# Patient Record
Sex: MALE | Race: WHITE | Employment: FULL TIME | URBAN - METROPOLITAN AREA
[De-identification: names, ages, dates, MRNs, and addresses within clinical notes are randomized per-mention and may not be internally consistent; named-entity substitution may affect disease eponyms.]

---

## 2019-06-26 ENCOUNTER — OFFICE VISIT (OUTPATIENT)
Dept: RHEUMATOLOGY | Age: 34
End: 2019-06-26

## 2019-06-26 VITALS
HEIGHT: 66 IN | HEART RATE: 67 BPM | TEMPERATURE: 98.2 F | DIASTOLIC BLOOD PRESSURE: 81 MMHG | WEIGHT: 172 LBS | SYSTOLIC BLOOD PRESSURE: 131 MMHG | BODY MASS INDEX: 27.64 KG/M2 | RESPIRATION RATE: 18 BRPM

## 2019-06-26 DIAGNOSIS — J06.9 RECURRENT URI (UPPER RESPIRATORY INFECTION): ICD-10-CM

## 2019-06-26 DIAGNOSIS — M06.4 UNDIFFERENTIATED INFLAMMATORY ARTHRITIS (HCC): Primary | ICD-10-CM

## 2019-06-26 RX ORDER — PREDNISONE 5 MG/1
TABLET ORAL
Qty: 91 TAB | Refills: 0 | Status: SHIPPED | OUTPATIENT
Start: 2019-06-26 | End: 2019-08-10

## 2019-06-26 RX ORDER — BISMUTH SUBSALICYLATE 262 MG
1 TABLET,CHEWABLE ORAL DAILY
COMMUNITY

## 2019-06-26 RX ORDER — AZELASTINE HCL 205.5 UG/1
1 SPRAY NASAL DAILY
COMMUNITY
End: 2019-09-20

## 2019-06-26 RX ORDER — CALCIUM CARBONATE 500(1250)
TABLET ORAL DAILY
COMMUNITY

## 2019-06-26 NOTE — PROGRESS NOTES
REASON FOR VISIT    This is the initial evaluation for Mr. Xavier Profit a 36 y.o.  male for question of a seronegative spondyloarthritis. The patient is referred to the [facility] at the request of Dr. Avril Fair. HISTORY OF PRESENT ILLNESS     I have reviewed and summarized old records from None. In 3/01/2019, he developed pain in his wrists, knees, and ankles. He reports having occaisonal sore throat in 2/2019 that was severe. He had sinus involvement that preceded diffuse body aches. He also developed tightness in his anterior chest. He was tested negative for flu. He had some redness in his eyes without pain or photosensitivity. He recuperated but then had recurrence with laryngitis and hoarseness. He since developed cyclical diffuse body aches. He reports that he has recurrent episodes about twice a month that starts with sore throat that then follows with knee, ankles, wrist pain. He has baseline aching wrist pain and at times, there is sharp pain. He reports that his knees feel stiff. His ankle pain is also aching. Diclofenac helped. Naprosyn helped a little. Today, he reports morning stiffness in his wrists, knees, and and ankles lasting an hour. He has aching pain in his wrists and ankles that is worse in the morning. Sometimes, the pain shoots into the fingers. He denies swelling. His pain is worse with rest and improves with activity. He feels better after working out. Some days, he has back stiffness in the morning that resolves after moving minutes. It happens a few times a week. It does not wake him at night. Diclofenac seems to be helping. He denies urinary symptoms. He denies any new sexual partner. His anterior rib pain lasts about an hour and occurs once a week and improves if he stretches out. Family history is negative. He works at [employer] and a .     Therapy History includes:  Current NSAIDs include: diclofenac 75 mg twice daily  Current DMARD include: none  Prior DMARD therapy includes: none  The following DMARDs have been ineffective: none  The following DMARDs were stopped because of side effects: none    REVIEW OF SYSTEMS    A 15 point review of systems was performed and summarized below. The questionnaire was reviewed with the patient and scanned into the patient's medical record.     General: endorses fatigue, denies recent weight gain, recent weight loss, weakness, fever, night sweats  Musculoskeletal: endorses joint pain, morning stiffness (lasting 30 minutes), denies joint swelling, muscle pain  Ears: denies ringing in ears, loss of hearing, deafness  Eyes: denies pain, redness, loss of vision, double vision, blurred vision, dryness, foreign body sensation  Mouth: denies sore tongue, oral ulcers, bleeding gums, loss of taste, dryness, increased dental caries  Nose: denies nosebleeds, loss of smell, nasal ulcers  Throat: endorses frequent sore throats, denies hoarseness, difficulty in swallowing, pain in jaw while chewing  Neck: denies swollen glands, tender glands  Cardiopulmonary: denies pain in chest, irregular heart beat, sudden changes in heart beat, shortness of breath, difficulty breathing at night, dry cough, productive cough, coughing of blood, wheezing  Gastrointestinal: denies nausea, heartburn, stomach pain relieved by food, vomiting of blood/\"coffee grounds\", jaundice, increasing constipation, persistent diarrhea, blood in stools, black stools  Genitourinary: denies nocturia, difficult urination, pain or burning on urination, blood in urine, cloudy urine, pus in urine, genital discharge, frequent urination, rash/ulcers, sexual difficulties, prostate trouble  Hematologic: denies anemia, bleeding tendency, blood clots  Skin: denies easy bruising, sun sensitive, rash, redness, hives, skin tightness, nodules/bumps, hair loss, color changes of hands or feet in the cold (Raynaud's)  Neurologic: denies headaches, dizziness, muscle weakness, numbness or tingling in hands/feet, memory loss  Psychiatric: denies depression, excessive worries, PTSD, Bipolar  Sleep: endorses daytime somnolence, denies poor sleep (6-7 hours), snoring, apnea, difficulty falling asleep, difficulty staying asleep     PAST MEDICAL HISTORY    He has no past medical history of Autoimmune disease ([address] FAMILY HISTORY    His family history includes Dementia in his maternal grandmother; Heart Disease in his maternal grandfather and paternal grandfather; No Known Problems in his father, mother, and sister. SOCIAL HISTORY    He reports that he has never smoked. He has never used smokeless tobacco. He reports that he drinks alcohol. He reports that he does not use drugs. HEALTH MAINTENANCE    Immunizations    There is no immunization history on file for this patient. MEDICATIONS    Current Outpatient Medications   Medication Sig Dispense Refill    multivitamin (ONE A DAY) tablet Take 1 Tab by mouth daily.  calcium carbonate (OS-BARBARA) 500 mg calcium (1,250 mg) tablet Take  by mouth daily.  azelastine (ASTEPRO) 0.15 % (205.5 mcg) 1 Spray by Both Nostrils route daily.  TURMERIC PO Take  by mouth two (2) times a day.  predniSONE (DELTASONE) 5 mg tablet 4 tabs daily for 7 days, 3 tabs for 7 days, 2 tabs for 14 days, 1 tab for 14 days 91 Tab 0     ALLERGIES    No Known Allergies    PHYSICAL EXAMINATION    Visit Vitals  /81   Pulse 67   Temp 98.2 °F (36.8 °C)   Resp 18   Ht 5' 6\" (1.676 m)   Wt 172 lb (78 kg)   BMI 27.76 kg/m²     Body mass index is 27.76 kg/m². General: Patient is alert, oriented x 3, not in acute distress    HEENT:   Conjunctiva are not injected and appear moist, oral mucous membranes are moist, there are no ulcers present, there is no alopecia, neck is supple, there is no lymphadenopathy    Cardiovascular:  Heart is regular rate and rhythm, no murmurs.     Chest:  Lungs are clear to auscultation bilaterally. Extremities:  Free of clubbing, cyanosis, edema, extremities well perfused. Neurological:  Muscle strength is full in upper and lower extremities. Skin:    Psoriasis:     no  Nail Pitting:     no  Onycholysis:     no  Palmoplantar pustulosis:   no  Acne fulminans:    no  Acne conglobata:    no  Hidradenitis Suppurativa:   no  Dissecting cellulitis of the scalp:  no  Pilonidal sinus:    no  Erythema nodosum:    no    Musculoskeletal:  A comprehensive musculoskeletal exam was performed for all joints of each upper and lower extremity and assessed for swelling, tenderness and range of motion. Bilateral ankle tenderness without swelling  Achilles tendon non-tender  Bilateral Toe tenderness    Costochondritis:  no   Synovitis:   yes  Dactylitis:   no  Enthesitis:   no    Joint Count 6/26/2019   Patient pain (0-100) 40   MHAQ 0   Left wrist- Tender 1   Left wrist- Swollen 1   Left thumb IP - Tender 1   Left thumb IP - Swollen 0   Left 2nd PIP - Tender 1   Left 2nd PIP - Swollen 1   Left 3rd PIP - Tender 1   Left 3rd PIP - Swollen 1   Left 4th PIP - Tender 1   Left 4th PIP - Swollen 1   Left 5th PIP - Tender 1   Left 5th PIP - Swollen 1   Right wrist- Tender 1   Right wrist- Swollen 1   Right 4th MCP - Tender 1   Right 4th MCP - Swollen 1   Right 5th MCP - Tender 1   Right 5th MCP - Swollen 1   Right thumb IP - Tender 1   Right thumb IP - Swollen 0   Right 2nd PIP - Tender 1   Right 2nd PIP - Swollen 1   Right 3rd PIP - Tender 1   Right 3rd PIP - Swollen 1   Right 5th PIP - Tender 1   Right 5th PIP - Swollen 0   Tender Joint Count (Total) 13   Swollen Joint Count (Total) 10   Physician Assessment (0-10) 2   Patient Assessment (0-10) 2   CDAI Total (calculated) 27     DATA REVIEW    Prior medical records were reviewed and are summarized as below:    Laboratory data: none    Imaging: none     ASSESSMENT AND PLAN    1) Undifferentiated Inflammatory Arthritis.  This appears to have started after a preceding URI. He denies urinary or GI symptoms. He reports intermittent low back stiffness which is not regular. He also reports intermittent sternal stiffness that is intermittent and resolves after an hour. These raise the suspicion for an spondyloarthritis but the are not persistent, make it it unlikely. He does have active joint involvement. His CDAI was 27 with 13 tender and 10 swollen joints, including bilateral ankle and toe involvement, consistent with high high disease activity. He feels better with diclofenac. I ordered labs and radiographs today. I will start him on a short course of prednisone, called a prednisone taper, with 5 mg tablets. This regimen is to be taken as follows: 4 tabs (20 mg) for 7 days, 3 tabs (15 mg) for 7 days, 2 tabs (10 mg) for 14 days and then 1 tab (5 mg) for 14 days, and then stop. [facility] after I review his studies. I asked him to stop diclofenac while taking prednisone. 2) Recurrent URI. This may have activated his immune system. The patient voiced understanding of the aforementioned assessment and plan. Summary of plan was provided in the After Visit Summary patient instructions. I also provided education about MyChart setup and utility.     TODAY'S ORDERS    Orders Placed This Encounter    QUANTIFERON-TB GOLD PLUS    XR HAND RT MIN 3 V    XR HAND LT MIN 3 V    XR FOOT RT MIN 3 V    XR FOOT LT MIN 3 V    XR SI JTS MIN 3 V    CYCLIC CITRUL PEPTIDE AB, IGG    CBC WITH AUTOMATED DIFF    CHRONIC HEPATITIS PANEL    METABOLIC PANEL, COMPREHENSIVE    C REACTIVE PROTEIN, QT    SED RATE (ESR)    HLA-B27    RHEUMATOID FACTOR, QL    PROTEIN ELECTROPHORESIS W/ REFLX ANNIE    VITAMIN D, 25 HYDROXY    URIC ACID    ANTINUCLEAR ANTIBODIES, IFA    predniSONE (DELTASONE) 5 mg tablet     Future Appointments   Date Time Provider Jorge Garrett   9/20/2019  8:20 AM MD YISEL Silvestre 11 Janeth Hines MD, [address] [facility]    Adult Rheumatology   Rheumatology Ultrasound Certified  [facility]  A Part of [facility], [address] [place] [address]   Phone [phone]  Fax [phone]

## 2019-06-26 NOTE — PATIENT INSTRUCTIONS
I will start you on a short course of prednisone, called a prednisone taper, with 5 mg tablets. This regimen is to be taken as follows:  
 
 - 4 tablets (20 mg), all at once, daily for 7 days - 3 tablets (15 mg), all at once, daily for 7 days - 2 tablets (10 mg), all at once, daily for 14 days - 1 tablet (5 mg), daily for 14 days - then STOP

## 2019-06-26 NOTE — LETTER
6/26/19 Patient: Aden Scheuermann YOB: 1985 Date of Visit: 6/26/2019 Josefa Moralez MD 
[address] [place] [address] VIA Facsimile: [phone] Dear Josefa Moralez MD, Thank you for referring Mr. Aden Scheuermann to [facility] for evaluation. My notes for this consultation are attached. If you have questions, please do not hesitate to call me. I look forward to following your patient along with you.  
 
 
Sincerely, 
 
Alisha Encinas MD

## 2019-07-01 ENCOUNTER — PATIENT MESSAGE (OUTPATIENT)
Dept: RHEUMATOLOGY | Age: 34
End: 2019-07-01

## 2019-07-01 DIAGNOSIS — M06.4 UNDIFFERENTIATED INFLAMMATORY ARTHRITIS (HCC): Primary | ICD-10-CM

## 2019-07-01 LAB
25(OH)D3+25(OH)D2 SERPL-MCNC: 31 NG/ML (ref 30–100)
ALBUMIN SERPL ELPH-MCNC: 4.4 G/DL (ref 2.9–4.4)
ALBUMIN SERPL-MCNC: 4.5 G/DL (ref 3.5–5.5)
ALBUMIN/GLOB SERPL: 1.4 {RATIO} (ref 0.7–1.7)
ALBUMIN/GLOB SERPL: 1.5 {RATIO} (ref 1.2–2.2)
ALP SERPL-CCNC: 80 IU/L (ref 39–117)
ALPHA1 GLOB SERPL ELPH-MCNC: 0.3 G/DL (ref 0–0.4)
ALPHA2 GLOB SERPL ELPH-MCNC: 0.7 G/DL (ref 0.4–1)
ALT SERPL-CCNC: 23 IU/L (ref 0–44)
AST SERPL-CCNC: 22 IU/L (ref 0–40)
B-GLOBULIN SERPL ELPH-MCNC: 1 G/DL (ref 0.7–1.3)
BASOPHILS # BLD AUTO: 0 X10E3/UL (ref 0–0.2)
BASOPHILS NFR BLD AUTO: 0 %
BILIRUB SERPL-MCNC: 0.3 MG/DL (ref 0–1.2)
BUN SERPL-MCNC: 10 MG/DL (ref 6–20)
BUN/CREAT SERPL: 13 (ref 9–20)
CALCIUM SERPL-MCNC: 9.6 MG/DL (ref 8.7–10.2)
CCP IGA+IGG SERPL IA-ACNC: 5 UNITS (ref 0–19)
CHLORIDE SERPL-SCNC: 104 MMOL/L (ref 96–106)
CO2 SERPL-SCNC: 26 MMOL/L (ref 20–29)
COMMENT, 144067: NORMAL
CREAT SERPL-MCNC: 0.8 MG/DL (ref 0.76–1.27)
CRP SERPL-MCNC: <1 MG/L (ref 0–10)
EOSINOPHIL # BLD AUTO: 0 X10E3/UL (ref 0–0.4)
EOSINOPHIL NFR BLD AUTO: 1 %
ERYTHROCYTE [DISTWIDTH] IN BLOOD BY AUTOMATED COUNT: 14.3 % (ref 12.3–15.4)
ERYTHROCYTE [SEDIMENTATION RATE] IN BLOOD BY WESTERGREN METHOD: 2 MM/HR (ref 0–15)
GAMMA GLOB SERPL ELPH-MCNC: 1.2 G/DL (ref 0.4–1.8)
GAMMA INTERFERON BACKGROUND BLD IA-ACNC: 0.02 IU/ML
GLOBULIN SER CALC-MCNC: 3 G/DL (ref 1.5–4.5)
GLOBULIN SER CALC-MCNC: 3.1 G/DL (ref 2.2–3.9)
GLUCOSE SERPL-MCNC: 90 MG/DL (ref 65–99)
HBV CORE AB SERPL QL IA: NEGATIVE
HBV CORE IGM SERPL QL IA: NEGATIVE
HBV E AB SERPL QL IA: NEGATIVE
HBV E AG SERPL QL IA: NEGATIVE
HBV SURFACE AB SER QL: NON REACTIVE
HBV SURFACE AG SERPL QL IA: NEGATIVE
HCT VFR BLD AUTO: 41.6 % (ref 37.5–51)
HCV AB S/CO SERPL IA: <0.1 S/CO RATIO (ref 0–0.9)
HGB BLD-MCNC: 14.3 G/DL (ref 13–17.7)
HLA-B27 QL NAA+PROBE: POSITIVE
IMM GRANULOCYTES # BLD AUTO: 0 X10E3/UL (ref 0–0.1)
IMM GRANULOCYTES NFR BLD AUTO: 0 %
LYMPHOCYTES # BLD AUTO: 1.5 X10E3/UL (ref 0.7–3.1)
LYMPHOCYTES NFR BLD AUTO: 33 %
M PROTEIN SERPL ELPH-MCNC: NORMAL G/DL
M TB IFN-G BLD-IMP: NEGATIVE
M TB IFN-G CD4+ BCKGRND COR BLD-ACNC: 0.02 IU/ML
MCH RBC QN AUTO: 28.6 PG (ref 26.6–33)
MCHC RBC AUTO-ENTMCNC: 34.4 G/DL (ref 31.5–35.7)
MCV RBC AUTO: 83 FL (ref 79–97)
MITOGEN IGNF BLD-ACNC: >10 IU/ML
MONOCYTES # BLD AUTO: 0.4 X10E3/UL (ref 0.1–0.9)
MONOCYTES NFR BLD AUTO: 9 %
NEUTROPHILS # BLD AUTO: 2.7 X10E3/UL (ref 1.4–7)
NEUTROPHILS NFR BLD AUTO: 57 %
PLATELET # BLD AUTO: 215 X10E3/UL (ref 150–450)
PLEASE NOTE, 011150: NORMAL
POTASSIUM SERPL-SCNC: 4.3 MMOL/L (ref 3.5–5.2)
PROT PATTERN SERPL ELPH-IMP: NORMAL
PROT SERPL-MCNC: 7.5 G/DL (ref 6–8.5)
QUANTIFERON INCUBATION, QF1T: NORMAL
QUANTIFERON TB2 AG: 0.02 IU/ML
RBC # BLD AUTO: 5 X10E6/UL (ref 4.14–5.8)
RHEUMATOID FACT SERPL-ACNC: <10 IU/ML (ref 0–13.9)
SERVICE CMNT-IMP: NORMAL
SODIUM SERPL-SCNC: 143 MMOL/L (ref 134–144)
URATE SERPL-MCNC: 5.4 MG/DL (ref 3.7–8.6)
WBC # BLD AUTO: 4.7 X10E3/UL (ref 3.4–10.8)

## 2019-07-03 RX ORDER — FOLIC ACID 1 MG/1
1 TABLET ORAL DAILY
Qty: 90 TAB | Refills: 0 | Status: SHIPPED | OUTPATIENT
Start: 2019-07-03 | End: 2019-09-20 | Stop reason: SDUPTHER

## 2019-07-03 RX ORDER — METHOTREXATE 2.5 MG/1
15 TABLET ORAL
Qty: 72 TAB | Refills: 0 | Status: SHIPPED | OUTPATIENT
Start: 2019-07-08 | End: 2019-09-12 | Stop reason: SDUPTHER

## 2019-07-03 NOTE — TELEPHONE ENCOUNTER
From: Les Head  Sent: 7/2/2019 5:59 PM EDT  To: Kaden David MD  Subject: RE:(No subject)    ----- Message from [address] [place] [address] 951, [facility] sent at 7/2/2019 5:59 PM EDT -----    Still some soreness, it's a bit better than on the Diclofenac. I definitely felt it hit my mood over the weekend, already a bit impacted by the entire situation it made me feel a bit more depressed. Possible side-effect observed in that my eyes seem to get tired more quickly, especially under the CFL lights at work and looking at a monitor all day. Clears up pretty quickly when I get outside. Felt a bit lightheaded when I stood up for a minute or so early Monday but not nearly as observable today. I resumed taking the omeprezol because I was getting some acid reflux. It seems to be taking care of it.  ----- Message -----  From: Kaden David MD  Sent: 7/1/2019 4:33 PM EDT  To: Les Head  Subject: (No subject)  How are you feeling on prednisone?

## 2019-08-02 ENCOUNTER — TELEPHONE (OUTPATIENT)
Dept: RHEUMATOLOGY | Age: 34
End: 2019-08-02

## 2019-08-02 NOTE — TELEPHONE ENCOUNTER
Patient is calling due to he is taking Methotrexate, and he is asking if he needs to do blood work for a Liver Function Test. His phone is [phone].

## 2019-08-05 NOTE — TELEPHONE ENCOUNTER
Spoke with pt and let him know that he had lab work done on 6/26 and that he is good on lab work until his next appt in Sept.  He stated an understanding.

## 2019-09-12 DIAGNOSIS — M06.4 UNDIFFERENTIATED INFLAMMATORY ARTHRITIS (HCC): ICD-10-CM

## 2019-09-12 RX ORDER — METHOTREXATE 2.5 MG/1
20 TABLET ORAL
Qty: 96 TAB | Refills: 0 | Status: SHIPPED | OUTPATIENT
Start: 2019-09-16 | End: 2019-09-20 | Stop reason: SDUPTHER

## 2019-09-20 ENCOUNTER — OFFICE VISIT (OUTPATIENT)
Dept: RHEUMATOLOGY | Age: 34
End: 2019-09-20

## 2019-09-20 VITALS
WEIGHT: 171 LBS | SYSTOLIC BLOOD PRESSURE: 127 MMHG | HEART RATE: 71 BPM | DIASTOLIC BLOOD PRESSURE: 84 MMHG | BODY MASS INDEX: 27.48 KG/M2 | RESPIRATION RATE: 18 BRPM | HEIGHT: 66 IN | TEMPERATURE: 97.5 F

## 2019-09-20 DIAGNOSIS — J06.9 RECURRENT URI (UPPER RESPIRATORY INFECTION): ICD-10-CM

## 2019-09-20 DIAGNOSIS — M45.A0 NON-RADIOGRAPHIC AXIAL SPONDYLOARTHRITIS (HCC): Primary | ICD-10-CM

## 2019-09-20 DIAGNOSIS — Z79.60 LONG-TERM USE OF IMMUNOSUPPRESSANT MEDICATION: ICD-10-CM

## 2019-09-20 RX ORDER — METHOTREXATE 2.5 MG/1
20 TABLET ORAL
Qty: 90 TAB | Refills: 0 | Status: SHIPPED | OUTPATIENT
Start: 2019-09-21 | End: 2020-02-12

## 2019-09-20 RX ORDER — FOLIC ACID 1 MG/1
1 TABLET ORAL DAILY
Qty: 90 TAB | Refills: 0 | Status: SHIPPED | OUTPATIENT
Start: 2019-09-20 | End: 2019-12-18

## 2019-09-20 NOTE — PROGRESS NOTES
REASON FOR VISIT    This is a follow-up visit for Mr. 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     Inflammatory arthritis phenotype includes:  Anti-CCP positive: no  Rheumatoid factor positive: no  HLA-B27 positive: yes  Inflammatory Back Pain: yes  Erosive disease: no  Sacroiliitis: no  Ankylosis: no  Psoriasis: no  Enthesitis: no  Dactylitis: no  Nail Pitting: no  Onycholysis: no  Splinter Hemorrhage: no  Extra-articular manifestations include: sternal pain (costochondritis), oral painful ulcers (since childhood)  SAPHO: no    Immunosuppression Screening (6/26/2019): Quantiferon TB: negative  PPD:  Not performed  Hepatitis B: negative  Hepatitis C: negative    Therapy History includes:  Current DMARD therapy include: methotrexate 20 mg every Saturday (7/01/2019 to present)  Prior DMARD therapy include: none  Discontinued DMARDs because of inefficacy: None  Discontinued DMARDs because of side effects: None    There is no immunization history on file for this patient. 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 Lindsey Monge returns for a follow-up. On his last visit, I suspected that he had a spondyloarthritis. I prescribed a prednisone taper but he did not tolerate it, so I started methotrexate     Today, he feels better. At times, he has morning stiffness in his knees, ankles, and low back lasting minutes. He no longer has sternal pain. He notes some scratchiness in his throat that resolves with Tylenol. It feels like as if he was coughing a lot but he is no longer coughing as much. He saw his eye doctor recently who diagnosed him with blepharitis. He a    He endorses sores in mouth not related to methotrexate, dry cough.     He denies fever, weight loss, blurred vision, vision loss, ankle swelling, dyspnea, nausea, vomiting, dysphagia, abdominal pain, black or bloody stool, fall since last visit, rash, easy bruising and increased thirst.    Mr. Gucci Gonzalez has continued his medications for arthritis and reports good tolerance without significant side effects. Last toxicity monitoring by blood work was done on 6/26/2019 and did not reveal any significant adverse effects. Most recent inflammatory markers from 6/26/2019 revealed a ESR 2 mm/hr and CRP <1 mg/L. The patient has not had any interval hospital admissions, infections, or surgeries. REVIEW OF SYSTEMS    A comprehensive review of systems was performed and pertinent results are documented in the HPI, review of systems is otherwise non-contributory. PAST MEDICAL HISTORY    He has a past medical history of Autoimmune disease ([address] FAMILY HISTORY    His family history includes Dementia in his maternal grandmother; Heart Disease in his maternal grandfather and paternal grandfather; No Known Problems in his father, mother, and sister. SOCIAL HISTORY    He reports that he has never smoked. He has never used smokeless tobacco. He reports that he drinks alcohol. He reports that he does not use drugs. MEDICATIONS    Current Outpatient Medications   Medication Sig Dispense Refill    omega 3-dha-epa-fish oil (FISH OIL) 100-160-1,000 mg cap Take  by mouth daily.  folic acid (FOLVITE) 1 mg tablet Take 1 Tab by mouth daily. 90 Tab 0    [START ON 9/21/2019] methotrexate (RHEUMATREX) 2.5 mg tablet Take 8 Tabs by mouth Every Saturday. 90 Tab 0    multivitamin (ONE A DAY) tablet Take 1 Tab by mouth daily.  calcium carbonate (OS-BARBARA) 500 mg calcium (1,250 mg) tablet Take  by mouth daily.  TURMERIC PO Take  by mouth two (2) times a day.           ALLERGIES    No Known Allergies    PHYSICAL EXAMINATION    Visit Vitals  /84   Pulse 71   Temp 97.5 °F (36.4 °C)   Resp 18   Ht 5' 6\" (1.676 m)   Wt 171 lb (77.6 kg)   BMI 27.60 kg/m²     Body mass index is 27.6 kg/m². General: Patient is alert, oriented x 3, not in acute distress    HEENT:   Sclerae are not injected and appear moist.  There is no alopecia. Neck is supple     Cardiovascular:  Heart is regular rate and rhythm, no murmurs. Chest:  Lungs are clear to auscultation bilaterally. No rhonchi, wheezes, or crackles. Extremities:  Free of clubbing, cyanosis, edema    Neurological exam:  Muscle strength is full in upper and lower extremities     Skin exam:  There are no rashes, no alopecia, no discoid lesions, no active Raynaud's, no livedo reticularis, no periungual erythema. Musculoskeletal exam:  A comprehensive musculoskeletal exam was performed for all joints of each upper and lower extremity and assessed for swelling, tenderness and range of motion.  Positive results are documented as below:    Bilateral ankle tenderness without swelling  Bilateral Toe tenderness (RESOLVED)    Z-Deformities:   no  Sebring Neck Deformities:  no  Boutonierre's Deformities:  no  Ulnar Deviation:   no  MCP Subluxation:  No  Costochondritis:  No   Dactylitis:   No   Enthesitis:   No      Joint Count 9/20/2019 6/26/2019   Patient pain (0-100) 20 40   MHAQ 0 0   Left wrist- Tender 1 1   Left wrist- Swollen 0 1   Left thumb IP - Tender - 1   Left thumb IP - Swollen - 0   Left 2nd PIP - Tender - 1   Left 2nd PIP - Swollen - 1   Left 3rd PIP - Tender - 1   Left 3rd PIP - Swollen - 1   Left 4th PIP - Tender - 1   Left 4th PIP - Swollen - 1   Left 5th PIP - Tender - 1   Left 5th PIP - Swollen - 1   Right wrist- Tender 1 1   Right wrist- Swollen 1 1   Right 4th MCP - Tender - 1   Right 4th MCP - Swollen - 1   Right 5th MCP - Tender - 1   Right 5th MCP - Swollen - 1   Right thumb IP - Tender - 1   Right thumb IP - Swollen - 0   Right 2nd PIP - Tender - 1   Right 2nd PIP - Swollen - 1   Right 3rd PIP - Tender - 1   Right 3rd PIP - Swollen - 1   Right 5th PIP - Tender - 1   Right 5th PIP - Swollen - 0   Tender Joint Count (Total) 2 13   Swollen Joint Count (Total) 1 10   Physician Assessment (0-10) 1 2   Patient Assessment (0-10) 2 2   CDAI Total (calculated) 6 27       DATA REVIEW    Laboratory     Recent laboratory results were reviewed, summarized, and discussed with the patient. Imaging    Musculoskeletal Ultrasound    None    Radiographs    Bilateral Hand 6/26/2019: LEFT: There is no acute fracture or bony erosion. The joint spacesare maintained. The soft tissues are unremarkable. RIGHT: There is no acute fracture or bony erosion. The joint spaces are maintained. The soft tissues are unremarkable. Sacroiliac Joint 6/26/2019: There is no acute fracture or bony erosion. The sacroiliac and hip joint spaces are maintained. There is no sclerosis adjacent to the sacroiliac joints.     Bilateral Foot 6/26/2019: LEFT: There is no acute fracture or bony erosion. The joint spaces are maintained. The soft tissues are unremarkable. RIGHT:There is no acute fracture or bony erosion. The joint spaces are maintained. The soft tissues are unremarkable. CT Imaging    None    MR Imaging    None    DXA     None    ASSESSMENT AND PLAN    This is a follow-up visit for Mr. Huhg Coker. 1) Non-Radiographic Axial Spondylitis. This appears to have started after a preceding URI. He denies urinary or GI symptoms. He reports intermittent low back stiffness which is not regular. He also reports intermittent sternal stiffness that is intermittent and resolves after an hour. He endorses a history of oral ulcers since childhood which seemed to have improved after starting methotrexate and folic acid. These raise the suspicion for an spondyloarthritis but the are not persistent, make it it unlikely. He does have active joint involvement. He has been on methotrexate 20 mg every Saturday for almost 2 months and feels better. His symptoms have mostly abated. He no longer has sternal chest pain.  His CDAI was 6 (previously 27) with 2 tender and 1 swollen joints, including bilateral ankle involvement, consistent with low disease activity. I will therefore continue treatment. 2) Long Term Use of Immunosuppressants. The patient remains on immunomodulatory medications (methotrexate) and requires frequent toxicity monitoring by blood work. Respective labs were ordered (CBC and CMP). 3) Recurrent URI. This has improved without much recurrence like before    The patient voiced understanding of the aforementioned assessment and plan. Summary of plan was provided in the After Visit Summary patient instructions.      TODAY'S ORDERS    Orders Placed This Encounter    CBC WITH AUTOMATED DIFF    METABOLIC PANEL, COMPREHENSIVE    C REACTIVE PROTEIN, QT    SED RATE (ESR)    METHOTREXATE POLYGLUTAMATES    folic acid (FOLVITE) 1 mg tablet    methotrexate (RHEUMATREX) 2.5 mg tablet     Future Appointments   Date Time Provider Jorge Tami   12/24/2019  8:20 AM Edgar Mccain MD [place], MD, [address] [place] [address] Rd    Adult Rheumatology   Rheumatology Ultrasound Certified  [facility]  A Part of [address]   Phone [phone]  Fax [phone]

## 2019-09-20 NOTE — LETTER
9/20/19 Patient: Carrie Tim YOB: 1985 Date of Visit: 9/20/2019 Kassy Toussaint MD 
[address] [place] [address] VIA Facsimile: [phone] Dear Kassy Toussaint MD, Thank you for referring Mr. Carrie Tim to [address] [place] [address] for evaluation. My notes for this consultation are attached. If you have questions, please do not hesitate to call me. I look forward to following your patient along with you.  
 
 
Sincerely, 
 
Daniel Don MD

## 2019-09-20 NOTE — PROGRESS NOTES
Chief Complaint   Patient presents with    Arthritis     1. Have you been to the ER, urgent care clinic since your last visit? Hospitalized since your last visit? No    2. Have you seen or consulted any other health care providers outside of the [address] [place] [address] since your last visit? Include any pap smears or colon screening.  Yes Opthamologist for check up

## 2019-09-25 LAB
ALBUMIN SERPL-MCNC: 4.8 G/DL (ref 3.5–5.5)
ALBUMIN/GLOB SERPL: 2.2 {RATIO} (ref 1.2–2.2)
ALP SERPL-CCNC: 74 IU/L (ref 39–117)
ALT SERPL-CCNC: 23 IU/L (ref 0–44)
AST SERPL-CCNC: 22 IU/L (ref 0–40)
BASOPHILS # BLD AUTO: 0 X10E3/UL (ref 0–0.2)
BASOPHILS NFR BLD AUTO: 1 %
BILIRUB SERPL-MCNC: 0.3 MG/DL (ref 0–1.2)
BUN SERPL-MCNC: 11 MG/DL (ref 6–20)
BUN/CREAT SERPL: 14 (ref 9–20)
CALCIUM SERPL-MCNC: 10 MG/DL (ref 8.7–10.2)
CHLORIDE SERPL-SCNC: 104 MMOL/L (ref 96–106)
CO2 SERPL-SCNC: 21 MMOL/L (ref 20–29)
CREAT SERPL-MCNC: 0.79 MG/DL (ref 0.76–1.27)
CRP SERPL-MCNC: <1 MG/L (ref 0–10)
EOSINOPHIL # BLD AUTO: 0.1 X10E3/UL (ref 0–0.4)
EOSINOPHIL NFR BLD AUTO: 1 %
ERYTHROCYTE [DISTWIDTH] IN BLOOD BY AUTOMATED COUNT: 14.4 % (ref 12.3–15.4)
ERYTHROCYTE [SEDIMENTATION RATE] IN BLOOD BY WESTERGREN METHOD: 12 MM/HR (ref 0–15)
GLOBULIN SER CALC-MCNC: 2.2 G/DL (ref 1.5–4.5)
GLUCOSE SERPL-MCNC: 87 MG/DL (ref 65–99)
HCT VFR BLD AUTO: 41.3 % (ref 37.5–51)
HGB BLD-MCNC: 13.9 G/DL (ref 13–17.7)
IMM GRANULOCYTES # BLD AUTO: 0 X10E3/UL (ref 0–0.1)
IMM GRANULOCYTES NFR BLD AUTO: 0 %
LYMPHOCYTES # BLD AUTO: 1.7 X10E3/UL (ref 0.7–3.1)
LYMPHOCYTES NFR BLD AUTO: 37 %
MCH RBC QN AUTO: 29.3 PG (ref 26.6–33)
MCHC RBC AUTO-ENTMCNC: 33.7 G/DL (ref 31.5–35.7)
MCV RBC AUTO: 87 FL (ref 79–97)
METHOTREXATE PG1: 33.41 NMOL/L RBC
METHOTREXATE PG2: 22.24 NMOL/L RBC
METHOTREXATE PG3: 32.21 NMOL/L RBC
METHOTREXATE PG4: 13.46 NMOL/L RBC
METHOTREXATE PG5: 3.29 NMOL/L RBC
METHOTREXATE-PG TOTAL: 104.62 NMOL/L RBC
MONOCYTES # BLD AUTO: 0.4 X10E3/UL (ref 0.1–0.9)
MONOCYTES NFR BLD AUTO: 9 %
MTXPGSRA INTERPRETATION: NORMAL
NEUTROPHILS # BLD AUTO: 2.4 X10E3/UL (ref 1.4–7)
NEUTROPHILS NFR BLD AUTO: 52 %
PLATELET # BLD AUTO: 258 X10E3/UL (ref 150–450)
POTASSIUM SERPL-SCNC: 3.9 MMOL/L (ref 3.5–5.2)
PROT SERPL-MCNC: 7 G/DL (ref 6–8.5)
RBC # BLD AUTO: 4.75 X10E6/UL (ref 4.14–5.8)
SODIUM SERPL-SCNC: 144 MMOL/L (ref 134–144)
WBC # BLD AUTO: 4.6 X10E3/UL (ref 3.4–10.8)

## 2019-12-02 DIAGNOSIS — M06.4 UNDIFFERENTIATED INFLAMMATORY ARTHRITIS (HCC): ICD-10-CM

## 2019-12-02 RX ORDER — METHOTREXATE 2.5 MG/1
20 TABLET ORAL
Qty: 96 TAB | Refills: 0 | Status: SHIPPED | OUTPATIENT
Start: 2019-12-07 | End: 2019-12-06 | Stop reason: SDUPTHER

## 2019-12-06 ENCOUNTER — OFFICE VISIT (OUTPATIENT)
Dept: RHEUMATOLOGY | Age: 34
End: 2019-12-06

## 2019-12-06 VITALS
TEMPERATURE: 98.1 F | SYSTOLIC BLOOD PRESSURE: 122 MMHG | RESPIRATION RATE: 18 BRPM | HEART RATE: 76 BPM | HEIGHT: 66 IN | DIASTOLIC BLOOD PRESSURE: 82 MMHG | BODY MASS INDEX: 27.97 KG/M2 | WEIGHT: 174 LBS

## 2019-12-06 DIAGNOSIS — M45.A0 NON-RADIOGRAPHIC AXIAL SPONDYLOARTHRITIS (HCC): Primary | ICD-10-CM

## 2019-12-06 DIAGNOSIS — Z79.60 LONG-TERM USE OF IMMUNOSUPPRESSANT MEDICATION: ICD-10-CM

## 2019-12-06 DIAGNOSIS — J06.9 RECURRENT URI (UPPER RESPIRATORY INFECTION): ICD-10-CM

## 2019-12-06 NOTE — PROGRESS NOTES
Chief Complaint   Patient presents with    Arthritis     1. Have you been to the ER, urgent care clinic since your last visit? Hospitalized since your last visit? No    2. Have you seen or consulted any other health care providers outside of the [address] [place] [address] since your last visit? Include any pap smears or colon screening.  No

## 2019-12-06 NOTE — LETTER
12/7/19 Patient: Roel Bueno YOB: 1985 Date of Visit: 12/6/2019 Promise Giles MD 
[address] [place] [address] VIA Facsimile: [phone] Dear Promise Giles MD, Thank you for referring Mr. Roel Bueno to [facility] for evaluation. My notes for this consultation are attached. If you have questions, please do not hesitate to call me. I look forward to following your patient along with you.  
 
 
Sincerely, 
 
Adrienne Casillas MD

## 2019-12-06 NOTE — PROGRESS NOTES
REASON FOR VISIT    This is a follow-up visit for Mr. Morgan Castaneda for     ICD-10-CM   1. 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oarthritis ([facility] [place] [facility].) M46.90     Inflammatory arthritis phenotype includes:  Anti-CCP positive: no  Rheumatoid factor positive: no  HLA-B27 positive: yes  Inflammatory Back Pain: yes  Erosive disease: no  Sacroiliitis: no  Ankylosis: no  Psoriasis: no  Enthesitis: no  Dactylitis: no  Nail Pitting: no  Onycholysis: no  Splinter Hemorrhage: no  Extra-articular manifestations include: sternal pain (costochondritis), oral painful ulcers (since childhood)  SAPHO: no    Immunosuppression Screening (6/26/2019): Quantiferon TB: negative  PPD:  Not performed  Hepatitis B: negative  Hepatitis C: negative    Therapy History includes:  Current DMARD therapy include: methotrexate 20 mg every Saturday (7/01/2019 to present)  Prior DMARD therapy include: none  Discontinued DMARDs because of inefficacy: None  Discontinued DMARDs because of side effects: None    There is no immunization history on file for this patient. Patient Active Problem List   Diagnosis Code    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oarthritis ([facility] [place] [facility] M45.9    Long-term use of immunosuppressant medication Z79.899     HISTORY OF PRESENT ILLNESS    Mr. Morgan Castaneda returns for a follow-up. On his last visit, I continued methotrexate 20 mg every Saturday, which he has taken with good tolerance. Today, he feels pretty good. She has mild knee stiffness in his knees lasting minutes. At times, he may have left wrist pain that lasts about an hour and is random. If he worked out excessively, he may have mild ankle pain the day after. He ran today without limitations. His throat has been good but last month he had some pain that resolved. He denies sternal symptoms. He endorses blurred vision.     He denies fever, weight loss, vision loss, oral ulcers, ankle swelling, dry cough, dyspnea, nausea, vomiting, dysphagia, abdominal pain, black or bloody stool, fall since last visit, rash, easy bruising and increased thirst.    Mr. Nurys Blankenship has continued his medications for arthritis and reports good tolerance without significant side effects. Last toxicity monitoring by blood work was done on 9/20/2019 and did not reveal any significant adverse effects. Most recent inflammatory markers from 9/20/2019 revealed a ESR 12 mm/hr and CRP <1 mg/L. The patient has not had any interval hospital admissions, infections, or surgeries. REVIEW OF SYSTEMS    A comprehensive review of systems was performed and pertinent results are documented in the HPI, review of systems is otherwise non-contributory. PAST MEDICAL HISTORY    He has a past medical history of Autoimmune disease ([facility] FAMILY HISTORY    His family history includes Dementia in his maternal grandmother; Heart Disease in his maternal grandfather and paternal grandfather; No Known Problems in his father, mother, and sister. SOCIAL HISTORY    He reports that he has never smoked. He has never used smokeless tobacco. He reports current alcohol use. He reports that he does not use drugs. MEDICATIONS    Current Outpatient Medications   Medication Sig Dispense Refill    methotrexate, PF, (RASUVO, PF,) 25 mg/0.5 mL atIn 25 mg by SubCUTAneous route Every Saturday. Indications: rheumatoid arthritis 4 Syringe 11    omega 3-dha-epa-fish oil (FISH OIL) 100-160-1,000 mg cap Take  by mouth daily.  folic acid (FOLVITE) 1 mg tablet Take 1 Tab by mouth daily. 90 Tab 0    methotrexate (RHEUMATREX) 2.5 mg tablet Take 8 Tabs by mouth Every Saturday. 90 Tab 0    multivitamin (ONE A DAY) tablet Take 1 Tab by mouth daily.  calcium carbonate (OS-BARBARA) 500 mg calcium (1,250 mg) tablet Take  by mouth daily.           ALLERGIES    No Known Allergies    PHYSICAL EXAMINATION    Visit Vitals  /82   Pulse 76   Temp 98.1 °F (36.7 °C)   Resp 18   Ht 5' 6\" (1.676 m)   Wt 174 lb (78.9 kg)   BMI 28.08 kg/m²     Body mass index is 28.08 kg/m². General: Patient is alert, oriented x 3, not in acute distress    HEENT:   Sclerae are not injected and appear moist.  There is no alopecia. Neck is supple     Cardiovascular:  Heart is regular rate and rhythm, no murmurs. Chest:  Lungs are clear to auscultation bilaterally. No rhonchi, wheezes, or crackles. Extremities:  Free of clubbing, cyanosis, edema    Neurological exam:  Muscle strength is full in upper and lower extremities     Skin exam:  There are no rashes, no alopecia, no discoid lesions, no active Raynaud's, no livedo reticularis, no periungual erythema. Musculoskeletal exam:  A comprehensive musculoskeletal exam was performed for all joints of each upper and lower extremity and assessed for swelling, tenderness and range of motion.  Positive results are documented as below:    Bilateral ankle tenderness without swelling (RESOLVED)  No costochondral tenderness    Z-Deformities:   no  [place] Neck Deformities:  no  Boutonierre's Deformities:  no  Ulnar Deviation:   no  MCP Subluxation:  No  Costochondritis:  No   Dactylitis:   No   Enthesitis:   No      Joint Count 12/7/2019 9/20/2019 6/26/2019   Patient pain (0-100) - 20 40   MHAQ - 0 0   Left wrist- Tender 0 1 1   Left wrist- Swollen 0 0 1   Left thumb IP - Tender - - 1   Left thumb IP - Swollen - - 0   Left 2nd PIP - Tender - - 1   Left 2nd PIP - Swollen - - 1   Left 3rd PIP - Tender - - 1   Left 3rd PIP - Swollen - - 1   Left 4th PIP - Tender - - 1   Left 4th PIP - Swollen - - 1   Left 5th PIP - Tender - - 1   Left 5th PIP - Swollen - - 1   Right wrist- Tender - 1 1   Right wrist- Swollen - 1 1   Right 4th MCP - Tender - - 1   Right 4th MCP - Swollen - - 1   Right 5th MCP - Tender - - 1   Right 5th MCP - Swollen - - 1   Right thumb IP - Tender - - 1   Right thumb IP - Swollen - - 0   Right 2nd PIP - Tender - - 1   Right 2nd PIP - Swollen - - 1   Right 3rd PIP - Tender - - 1   Right 3rd PIP - Swollen - - 1   Right 5th PIP - Tender - - 1   Right 5th PIP - Swollen - - 0   Tender Joint Count (Total) 0 2 13   Swollen Joint Count (Total) 0 1 10   Physician Assessment (0-10) - 1 2   Patient Assessment (0-10) - 2 2   CDAI Total (calculated) - 6 27       DATA REVIEW    Laboratory     Recent laboratory results were reviewed, summarized, and discussed with the patient. Imaging    Musculoskeletal Ultrasound    None    Radiographs    Bilateral Hand 6/26/2019: LEFT: There is no acute fracture or bony erosion. The joint spacesare maintained. The soft tissues are unremarkable. RIGHT: There is no acute fracture or bony erosion. The joint spaces are maintained. The soft tissues are unremarkable. Sacroiliac Joint 6/26/2019: There is no acute fracture or bony erosion. The sacroiliac and hip joint spaces are maintained. There is no sclerosis adjacent to the sacroiliac joints.     Bilateral Foot 6/26/2019: LEFT: There is no acute fracture or bony erosion. The joint spaces are maintained. The soft tissues are unremarkable. RIGHT:There is no acute fracture or bony erosion. The joint spaces are maintained. The soft tissues are unremarkable. CT Imaging    None    MR Imaging    None    DXA     None    ASSESSMENT AND PLAN    This is a follow-up visit for Mr. Kory Vieira. 1) Non-Radiographic Axial Spondylitis. This appears to have started after a preceding URI. He denies urinary or GI symptoms. He had intermittent low back stiffness which was not regular. He also reported intermittent sternal stiffness that is intermittent that would resolve after an hour. He endorsed a history of oral ulcers since childhood which seemed to have improved after starting methotrexate and folic acid. These raised the suspicion for a spondyloarthritis. He is methotrexate 20 mg every Saturday with resolution of all his symptoms. He noted intermittent random left wrist pain. He is running without limitations. He feels well.  His CDAI was N/A (previously 6, 27) with 0 tender and 0 swollen joints. I discussed transitioning him to SubQ methotrexate (Rasuvo) for better bioavailability and higher dose of 25 mg weekly, since he continues to have intermittent left wrist pain. This will replace oral methotrexate. 2) Long Term Use of Immunosuppressants. The patient remains on immunomodulatory medications (methotrexate) and requires frequent toxicity monitoring by blood work. Respective labs were ordered (CBC and CMP). 3) Recurrent URI. This has improved without much recurrence like before    The patient voiced understanding of the aforementioned assessment and plan. Summary of plan was provided in the After Visit Summary patient instructions.      TODAY'S ORDERS    Orders Placed This Encounter    METHOTREXATE POLYGLUTAMATES    CBC WITH AUTOMATED DIFF    METABOLIC PANEL, COMPREHENSIVE    methotrexate, PF, (RASUVO, PF,) 25 mg/0.5 mL atIn     Future Appointments   Date Time Provider Jorge Tami   6/8/2020  8:40 AM Deedee Mccain MD [place], MD, [address]    Adult Rheumatology   Rheumatology Ultrasound Certified  [facility]  [facility], [address] [place] [address]   Phone [phone]  Fax [phone]

## 2019-12-11 LAB
ALBUMIN SERPL-MCNC: 4.9 G/DL (ref 3.5–5.5)
ALBUMIN/GLOB SERPL: 2.1 {RATIO} (ref 1.2–2.2)
ALP SERPL-CCNC: 78 IU/L (ref 39–117)
ALT SERPL-CCNC: 17 IU/L (ref 0–44)
AST SERPL-CCNC: 19 IU/L (ref 0–40)
BASOPHILS # BLD AUTO: 0 X10E3/UL (ref 0–0.2)
BASOPHILS NFR BLD AUTO: 0 %
BILIRUB SERPL-MCNC: 0.2 MG/DL (ref 0–1.2)
BUN SERPL-MCNC: 11 MG/DL (ref 6–20)
BUN/CREAT SERPL: 13 (ref 9–20)
CALCIUM SERPL-MCNC: 10.1 MG/DL (ref 8.7–10.2)
CHLORIDE SERPL-SCNC: 103 MMOL/L (ref 96–106)
CO2 SERPL-SCNC: 23 MMOL/L (ref 20–29)
CREAT SERPL-MCNC: 0.82 MG/DL (ref 0.76–1.27)
EOSINOPHIL # BLD AUTO: 0.1 X10E3/UL (ref 0–0.4)
EOSINOPHIL NFR BLD AUTO: 1 %
ERYTHROCYTE [DISTWIDTH] IN BLOOD BY AUTOMATED COUNT: 13.3 % (ref 12.3–15.4)
GLOBULIN SER CALC-MCNC: 2.3 G/DL (ref 1.5–4.5)
GLUCOSE SERPL-MCNC: 98 MG/DL (ref 65–99)
HCT VFR BLD AUTO: 39.9 % (ref 37.5–51)
HGB BLD-MCNC: 14 G/DL (ref 13–17.7)
IMM GRANULOCYTES # BLD AUTO: 0 X10E3/UL (ref 0–0.1)
IMM GRANULOCYTES NFR BLD AUTO: 0 %
LYMPHOCYTES # BLD AUTO: 2 X10E3/UL (ref 0.7–3.1)
LYMPHOCYTES NFR BLD AUTO: 29 %
MCH RBC QN AUTO: 31.1 PG (ref 26.6–33)
MCHC RBC AUTO-ENTMCNC: 35.1 G/DL (ref 31.5–35.7)
MCV RBC AUTO: 89 FL (ref 79–97)
METHOTREXATE PG1: 39.81 NMOL/L RBC
METHOTREXATE PG2: 27.1 NMOL/L RBC
METHOTREXATE PG3: 56.83 NMOL/L RBC
METHOTREXATE PG4: 24.46 NMOL/L RBC
METHOTREXATE PG5: 6.5 NMOL/L RBC
METHOTREXATE-PG TOTAL: 154.7 NMOL/L RBC
MONOCYTES # BLD AUTO: 0.5 X10E3/UL (ref 0.1–0.9)
MONOCYTES NFR BLD AUTO: 7 %
MTXPGSRA INTERPRETATION: NORMAL
NEUTROPHILS # BLD AUTO: 4.2 X10E3/UL (ref 1.4–7)
NEUTROPHILS NFR BLD AUTO: 63 %
PLATELET # BLD AUTO: 287 X10E3/UL (ref 150–450)
POTASSIUM SERPL-SCNC: 3.9 MMOL/L (ref 3.5–5.2)
PROT SERPL-MCNC: 7.2 G/DL (ref 6–8.5)
RBC # BLD AUTO: 4.5 X10E6/UL (ref 4.14–5.8)
SODIUM SERPL-SCNC: 142 MMOL/L (ref 134–144)
WBC # BLD AUTO: 6.7 X10E3/UL (ref 3.4–10.8)

## 2019-12-18 DIAGNOSIS — M45.A0 NON-RADIOGRAPHIC AXIAL SPONDYLOARTHRITIS (HCC): ICD-10-CM

## 2019-12-18 RX ORDER — FOLIC ACID 1 MG/1
TABLET ORAL
Qty: 90 TAB | Refills: 0 | Status: SHIPPED | OUTPATIENT
Start: 2019-12-18 | End: 2020-03-22

## 2019-12-26 ENCOUNTER — DOCUMENTATION ONLY (OUTPATIENT)
Dept: PHARMACY | Age: 34
End: 2019-12-26

## 2019-12-26 NOTE — PROGRESS NOTES
[facility] at [address] [facility] Update    Date: 12/26/19    [facility] 1985    Medication: Rasuvo 25mg/0.5mL syringe    Co-pay = $577.98 ($125/month with Co-pay card)     Patient has a high co-pay on their prescription. Pharmacy staff contacted the patient and advised them to call the [facility] at [phone] to discuss potentially obtaining the medication through the  directly or other treatment options, if appropriate. Pharmacist answered any questions that the patient had.     Terrance Jones, [address] at [facility]  [address], [address]   [place], [address]  phone: [phone]   fax: [phone]

## 2020-02-12 DIAGNOSIS — M45.A0 NON-RADIOGRAPHIC AXIAL SPONDYLOARTHRITIS (HCC): ICD-10-CM

## 2020-02-12 RX ORDER — METHOTREXATE 2.5 MG/1
TABLET ORAL
Qty: 96 TAB | Refills: 0 | Status: SHIPPED | OUTPATIENT
Start: 2020-02-12 | End: 2020-05-08

## 2020-03-21 DIAGNOSIS — M45.A0 NON-RADIOGRAPHIC AXIAL SPONDYLOARTHRITIS (HCC): ICD-10-CM

## 2020-03-22 RX ORDER — FOLIC ACID 1 MG/1
TABLET ORAL
Qty: 90 TAB | Refills: 0 | Status: SHIPPED | OUTPATIENT
Start: 2020-03-22 | End: 2020-06-04 | Stop reason: SDUPTHER

## 2020-05-08 DIAGNOSIS — M45.A0 NON-RADIOGRAPHIC AXIAL SPONDYLOARTHRITIS (HCC): ICD-10-CM

## 2020-05-08 RX ORDER — METHOTREXATE 2.5 MG/1
TABLET ORAL
Qty: 96 TAB | Refills: 0 | Status: SHIPPED | OUTPATIENT
Start: 2020-05-08 | End: 2020-06-08 | Stop reason: ALTCHOICE

## 2020-06-07 NOTE — PROGRESS NOTES
REASON FOR VISIT    This is a follow-up visit for Mr. Eligio Asencio for     ICD-10-CM   1. 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oarthritis [facility]) M46.90     The patient has consented for synchronous (real-time) Telemedicine (audio-video technology) on 6/8/2020 for their care to be delivered over telemedicine in place of their regularly scheduled office visit pursuant to the emergency declaration under the [address] [place] [address] [facility] authority and the [employer] Act, this Virtual  Visit was conducted, with patient's consent, to reduce the patient's risk of exposure to COVID-19 and provide continuity of care for an established patient. Services were provided through a video synchronous discussion virtually to substitute for in-person clinic visit. Inflammatory arthritis phenotype includes:  Anti-CCP positive: no  Rheumatoid factor positive: no  HLA-B27 positive: yes  Inflammatory Back Pain: yes  Erosive disease: no  Sacroiliitis: no  Ankylosis: no  Psoriasis: no  Enthesitis: no  Dactylitis: no  Nail Pitting: no  Onycholysis: no  Splinter Hemorrhage: no  Extra-articular manifestations include: sternal pain (costochondritis), oral painful ulcers (since childhood)  SAPHO: no    Immunosuppression Screening (6/26/2019): Quantiferon TB: negative  PPD:  Not performed  Hepatitis B: negative  Hepatitis C: negative    Therapy History includes:  Current DMARD therapy include: methotrexate 20 mg every Saturday (7/01/2019 to present)  Prior DMARD therapy include: none  Discontinued DMARDs because of inefficacy: None  Discontinued DMARDs because of side effects: None    There is no immunization history on file for this patient.     Patient Active Problem List   Diagnosis Code    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oarthritis ([facility] [address] M46.90    Long-term use of immunosuppressant medication Z79.899     HISTORY OF PRESENT ILLNESS    Mr. Eligio Asencio returns for a follow-up. On his last visit, I changed oral methotrexate 20 mg every Saturday, to SubQ 25 mg due to intermittent flares, but due to copay of 125$ per month, he continues with oral methotrexate, which he has taken with good tolerance. Today, he feels good. He reports having a flare once a month, where he has sores in his mouth, pharyngitis followed by left wrist and ankle sore pain 2 days later and lasts several days that resolves after 4 days. His most recent flare was last week. He also feels that his flares are not as severe as before. He denies fever, weight loss, blurred vision, vision loss, oral ulcers, ankle swelling, dry cough, dyspnea, nausea, vomiting, dysphagia, abdominal pain, black or bloody stool, fall since last visit, rash, easy bruising and increased thirst.    Last toxicity monitoring by blood work was done on 12/06/2019 and did not reveal any significant adverse effects. Most recent inflammatory markers from 9/20/2019 revealed a ESR 12 mm/hr and CRP <1 mg/L. The patient has not had any interval hospital admissions or surgeries. He had URI with flu-like symptoms in 2/2020. REVIEW OF SYSTEMS    A comprehensive review of systems was performed and pertinent results are documented in the HPI, review of systems is otherwise non-contributory. PAST MEDICAL HISTORY    He has a past medical history of Autoimmune disease ([facility] [address] FAMILY HISTORY    His family history includes Dementia in his maternal grandmother; Heart Disease in his maternal grandfather and paternal grandfather; No Known Problems in his father, mother, and sister. SOCIAL HISTORY    He reports that he has never smoked. He has never used smokeless tobacco. He reports current alcohol use. He reports that he does not use drugs.     MEDICATIONS    Current Outpatient Medications   Medication Sig Dispense Refill    folic acid (FOLVITE) 1 mg tablet TAKE 1 TABLET BY MOUTH EVERY DAY 90 Tab 1    [START ON 6/13/2020] methotrexate 25 mg/mL chemo injection 1 mL by SubCUTAneous route Every Saturday. 12 mL 1    [START ON 6/13/2020] Insulin Syringe-Needle U-100 1 mL 30 gauge x 5/16 syrg 1 Each by Does Not Apply route Every Saturday for 12 doses. To be used for methotrexate solution 12 Syringe 3    omega 3-dha-epa-fish oil (FISH OIL) 100-160-1,000 mg cap Take  by mouth daily.  multivitamin (ONE A DAY) tablet Take 1 Tab by mouth daily.  calcium carbonate (OS-BARBARA) 500 mg calcium (1,250 mg) tablet Take  by mouth daily.  methotrexate, PF, (RASUVO, PF,) 25 mg/0.5 mL atIn 25 mg by SubCUTAneous route Every Saturday. Indications: rheumatoid arthritis 4 Syringe 11        ALLERGIES    No Known Allergies    PHYSICAL EXAMINATION    There were no vitals taken for this visit. There is no height or weight on file to calculate BMI. General: Patient is alert, oriented x 3, not in acute distress    HEENT:   Sclerae are not injected and appear moist.  There is no alopecia. Neck is supple     Chest:  Breathing comfortably at room air    Musculoskeletal exam:  A comprehensive musculoskeletal exam was NOT performed for all joints of each upper and lower extremity and assessed for swelling, tenderness and range of motion.  Positive results are documented as below:    Previous Exam    Bilateral ankle tenderness without swelling (RESOLVED)  No costochondral tenderness    Z-Deformities:   no  [place] Neck Deformities:  no  Boutonierre's Deformities:  no  Ulnar Deviation:   no  MCP Subluxation:  No  Costochondritis:  No   Dactylitis:   No   Enthesitis:   No      Joint Count 12/7/2019 9/20/2019 6/26/2019   Patient pain (0-100) - 20 40   MHAQ - 0 0   Left wrist- Tender 0 1 1   Left wrist- Swollen 0 0 1   Left thumb IP - Tender - - 1   Left thumb IP - Swollen - - 0   Left 2nd PIP - Tender - - 1   Left 2nd PIP - Swollen - - 1   Left 3rd PIP - Tender - - 1   Left 3rd PIP - Swollen - - 1   Left 4th PIP - Tender - - 1   Left 4th PIP - Swollen - - 1 Left 5th PIP - Tender - - 1   Left 5th PIP - Swollen - - 1   Right wrist- Tender - 1 1   Right wrist- Swollen - 1 1   Right 4th MCP - Tender - - 1   Right 4th MCP - Swollen - - 1   Right 5th MCP - Tender - - 1   Right 5th MCP - Swollen - - 1   Right thumb IP - Tender - - 1   Right thumb IP - Swollen - - 0   Right 2nd PIP - Tender - - 1   Right 2nd PIP - Swollen - - 1   Right 3rd PIP - Tender - - 1   Right 3rd PIP - Swollen - - 1   Right 5th PIP - Tender - - 1   Right 5th PIP - Swollen - - 0   Tender Joint Count (Total) 0 2 13   Swollen Joint Count (Total) 0 1 10   Physician Assessment (0-10) - 1 2   Patient Assessment (0-10) - 2 2   CDAI Total (calculated) - 6 27       DATA REVIEW    Laboratory     Recent laboratory results were reviewed, summarized, and discussed with the patient. Imaging    Musculoskeletal Ultrasound    None    Radiographs    Bilateral Hand 6/26/2019: LEFT: There is no acute fracture or bony erosion. The joint spacesare maintained. The soft tissues are unremarkable. RIGHT: There is no acute fracture or bony erosion. The joint spaces are maintained. The soft tissues are unremarkable. Sacroiliac Joint 6/26/2019: There is no acute fracture or bony erosion. The sacroiliac and hip joint spaces are maintained. There is no sclerosis adjacent to the sacroiliac joints.     Bilateral Foot 6/26/2019: LEFT: There is no acute fracture or bony erosion. The joint spaces are maintained. The soft tissues are unremarkable. RIGHT:There is no acute fracture or bony erosion. The joint spaces are maintained. The soft tissues are unremarkable. CT Imaging    None    MR Imaging    None    DXA     None    ASSESSMENT AND PLAN    This is a follow-up visit for Mr. Ulises Starks. 1) Non-Radiographic Axial Spondylitis. This appears to have started after a preceding URI. He denies urinary or GI symptoms. He had intermittent low back stiffness which was not regular.  He also reported intermittent sternal stiffness that is intermittent that would resolve after an hour. He endorsed a history of oral ulcers since childhood which seemed to have improved after starting methotrexate and folic acid. These raised the suspicion for a spondyloarthritis. He is methotrexate 20 mg every Saturday with resolution of all his symptoms but notes intermittent flares monthly that are self-limited after 4 days (oral ulcers, pharyngitis followed by left wrist and ankle sore pain). He could not afford Rasuvo, so I will change him to methotrexate 25 mg SubQ every Saturday. He will come in for a teaching this Wednesday. This will replace oral methotrexate. His CDAI was previously N/A (previously 6, 27) with 0 tender and 0 swollen joints. Script sent out today. 2) Long Term Use of Immunosuppressants. The patient remains on immunomodulatory medications (methotrexate) and requires frequent toxicity monitoring by blood work. Respective labs were ordered (CBC and CMP). 3) Recurrent URI. This has improved without much recurrence like before    The patient voiced understanding of the aforementioned assessment and plan. The patient has consented for synchronous (real-time) Telemedicine (audio-video technology) on 6/8/2020 for their care to be delivered over telemedicine in place of their regularly scheduled office visit pursuant to the emergency declaration under the [facility] waiver authority and the [employer] Act, this Virtual  Visit was conducted, with patient's consent, to reduce the patient's risk of exposure to COVID-19 and provide continuity of care for an established patient. Services were provided through a video synchronous discussion virtually to substitute for in-person clinic visit.     TODAY'S ORDERS    Orders Placed This Encounter    CBC WITH AUTOMATED DIFF    METABOLIC PANEL, COMPREHENSIVE    folic acid (FOLVITE) 1 mg tablet  methotrexate 25 mg/mL chemo injection    Insulin Syringe-Needle U-100 1 mL 30 gauge x 5/16 syrg     No future appointments.   Awilda Wheeler MD, [address] [facility]    Adult Rheumatology   Rheumatology Ultrasound Certified  [address]  [facility], [facility], [address] [facility]   Phone [phone]  Fax [phone]

## 2020-06-08 ENCOUNTER — VIRTUAL VISIT (OUTPATIENT)
Dept: RHEUMATOLOGY | Age: 35
End: 2020-06-08

## 2020-06-08 DIAGNOSIS — J06.9 RECURRENT URI (UPPER RESPIRATORY INFECTION): ICD-10-CM

## 2020-06-08 DIAGNOSIS — Z79.60 LONG-TERM USE OF IMMUNOSUPPRESSANT MEDICATION: ICD-10-CM

## 2020-06-08 DIAGNOSIS — M45.A0 NON-RADIOGRAPHIC AXIAL SPONDYLOARTHRITIS (HCC): Primary | ICD-10-CM

## 2020-06-08 RX ORDER — METHOTREXATE 25 MG/ML
25 INJECTION, SOLUTION INTRA-ARTERIAL; INTRAMUSCULAR; INTRAVENOUS
Qty: 12 ML | Refills: 1 | Status: SHIPPED | OUTPATIENT
Start: 2020-06-13 | End: 2020-08-04

## 2020-06-08 RX ORDER — SYRINGE-NEEDLE,INSULIN,0.5 ML 30 GX5/16"
1 SYRINGE, EMPTY DISPOSABLE MISCELLANEOUS
Qty: 12 SYRINGE | Refills: 3 | Status: SHIPPED | OUTPATIENT
Start: 2020-06-13 | End: 2021-04-27

## 2020-06-08 RX ORDER — FOLIC ACID 1 MG/1
TABLET ORAL
Qty: 90 TAB | Refills: 1 | Status: SHIPPED | OUTPATIENT
Start: 2020-06-08 | End: 2020-09-08 | Stop reason: SDUPTHER

## 2020-06-10 ENCOUNTER — CLINICAL SUPPORT (OUTPATIENT)
Dept: RHEUMATOLOGY | Age: 35
End: 2020-06-10

## 2020-06-10 VITALS
BODY MASS INDEX: 27.97 KG/M2 | TEMPERATURE: 98 F | HEART RATE: 73 BPM | RESPIRATION RATE: 18 BRPM | SYSTOLIC BLOOD PRESSURE: 101 MMHG | HEIGHT: 66 IN | WEIGHT: 174 LBS | DIASTOLIC BLOOD PRESSURE: 68 MMHG

## 2020-06-10 DIAGNOSIS — M06.4 UNDIFFERENTIATED INFLAMMATORY ARTHRITIS (HCC): Primary | ICD-10-CM

## 2020-06-11 LAB
ALBUMIN SERPL-MCNC: 4.6 G/DL (ref 4–5)
ALBUMIN/GLOB SERPL: 1.8 {RATIO} (ref 1.2–2.2)
ALP SERPL-CCNC: 67 IU/L (ref 39–117)
ALT SERPL-CCNC: 18 IU/L (ref 0–44)
AST SERPL-CCNC: 24 IU/L (ref 0–40)
BASOPHILS # BLD AUTO: 0 X10E3/UL (ref 0–0.2)
BASOPHILS NFR BLD AUTO: 1 %
BILIRUB SERPL-MCNC: 0.5 MG/DL (ref 0–1.2)
BUN SERPL-MCNC: 13 MG/DL (ref 6–20)
BUN/CREAT SERPL: 15 (ref 9–20)
CALCIUM SERPL-MCNC: 9.9 MG/DL (ref 8.7–10.2)
CHLORIDE SERPL-SCNC: 106 MMOL/L (ref 96–106)
CO2 SERPL-SCNC: 23 MMOL/L (ref 20–29)
CREAT SERPL-MCNC: 0.87 MG/DL (ref 0.76–1.27)
EOSINOPHIL # BLD AUTO: 0 X10E3/UL (ref 0–0.4)
EOSINOPHIL NFR BLD AUTO: 1 %
ERYTHROCYTE [DISTWIDTH] IN BLOOD BY AUTOMATED COUNT: 13.4 % (ref 11.6–15.4)
GLOBULIN SER CALC-MCNC: 2.6 G/DL (ref 1.5–4.5)
GLUCOSE SERPL-MCNC: 85 MG/DL (ref 65–99)
HCT VFR BLD AUTO: 42.9 % (ref 37.5–51)
HGB BLD-MCNC: 14 G/DL (ref 13–17.7)
IMM GRANULOCYTES # BLD AUTO: 0 X10E3/UL (ref 0–0.1)
IMM GRANULOCYTES NFR BLD AUTO: 0 %
LYMPHOCYTES # BLD AUTO: 1.5 X10E3/UL (ref 0.7–3.1)
LYMPHOCYTES NFR BLD AUTO: 33 %
MCH RBC QN AUTO: 29.6 PG (ref 26.6–33)
MCHC RBC AUTO-ENTMCNC: 32.6 G/DL (ref 31.5–35.7)
MCV RBC AUTO: 91 FL (ref 79–97)
MONOCYTES # BLD AUTO: 0.5 X10E3/UL (ref 0.1–0.9)
MONOCYTES NFR BLD AUTO: 12 %
NEUTROPHILS # BLD AUTO: 2.3 X10E3/UL (ref 1.4–7)
NEUTROPHILS NFR BLD AUTO: 53 %
PLATELET # BLD AUTO: 247 X10E3/UL (ref 150–450)
POTASSIUM SERPL-SCNC: 4.1 MMOL/L (ref 3.5–5.2)
PROT SERPL-MCNC: 7.2 G/DL (ref 6–8.5)
RBC # BLD AUTO: 4.73 X10E6/UL (ref 4.14–5.8)
SODIUM SERPL-SCNC: 143 MMOL/L (ref 134–144)
WBC # BLD AUTO: 4.4 X10E3/UL (ref 3.4–10.8)

## 2020-08-04 DIAGNOSIS — M45.A0 NON-RADIOGRAPHIC AXIAL SPONDYLOARTHRITIS (HCC): ICD-10-CM

## 2020-08-04 RX ORDER — METHOTREXATE 25 MG/ML
25 INJECTION, SOLUTION INTRA-ARTERIAL; INTRAMUSCULAR; INTRAVENOUS
Qty: 12 ML | Refills: 1 | Status: SHIPPED | OUTPATIENT
Start: 2020-08-08 | End: 2020-09-08 | Stop reason: SDUPTHER

## 2020-09-08 ENCOUNTER — VIRTUAL VISIT (OUTPATIENT)
Dept: RHEUMATOLOGY | Age: 35
End: 2020-09-08
Payer: COMMERCIAL

## 2020-09-08 DIAGNOSIS — Z79.60 LONG-TERM USE OF IMMUNOSUPPRESSANT MEDICATION: ICD-10-CM

## 2020-09-08 DIAGNOSIS — J06.9 RECURRENT URI (UPPER RESPIRATORY INFECTION): ICD-10-CM

## 2020-09-08 DIAGNOSIS — M45.A0 NON-RADIOGRAPHIC AXIAL SPONDYLOARTHRITIS (HCC): Primary | ICD-10-CM

## 2020-09-08 PROCEDURE — 99215 OFFICE O/P EST HI 40 MIN: CPT | Performed by: INTERNAL MEDICINE

## 2020-09-08 RX ORDER — FOLIC ACID 1 MG/1
TABLET ORAL
Qty: 90 TAB | Refills: 1 | Status: SHIPPED | OUTPATIENT
Start: 2020-09-08 | End: 2021-05-10

## 2020-09-08 RX ORDER — METHOTREXATE 25 MG/ML
25 INJECTION, SOLUTION INTRA-ARTERIAL; INTRAMUSCULAR; INTRAVENOUS
Qty: 12 ML | Refills: 1 | Status: SHIPPED | OUTPATIENT
Start: 2020-09-09 | End: 2020-10-09 | Stop reason: SDUPTHER

## 2020-09-08 NOTE — PROGRESS NOTES
REASON FOR VISIT    This is a follow-up visit for Mr. Martina Osorio for     ICD-10-CM   1. 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 Services were provided through a video synchronous discussion virtually to substitute for in-person clinic visit. Inflammatory arthritis phenotype includes:  Anti-CCP positive: no  Rheumatoid factor positive: no  HLA-B27 positive: yes  Inflammatory Back Pain: yes  Erosive disease: no  Sacroiliitis: no  Ankylosis: no  Psoriasis: no  Enthesitis: no  Dactylitis: no  Nail Pitting: no  Onycholysis: no  Splinter Hemorrhage: no  Extra-articular manifestations include: sternal pain (costochondritis), oral painful ulcers (since childhood)  SAPHO: no    Immunosuppression Screening (6/26/2019): Quantiferon TB: negative  PPD:  Not performed  Hepatitis B: negative  Hepatitis C: negative    Therapy History includes:  Current DMARD therapy include: methotrexate 25 mg SubQ every Wednesday (6/10/2020 to present)  Prior DMARD therapy include: methotrexate 20 mg every Saturday (7/01/2019 to 6/10/2020)  Discontinued DMARDs because of inefficacy: None  Discontinued DMARDs because of side effects: None    There is no immunization history on file for this patient.     Patient Active Problem List   Diagnosis Code    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oarthritis ([facility] [address] M46.90    Long-term use of immunosuppressant medication Z79.899 HISTORY OF PRESENT ILLNESS    Mr. Martina Osorio returns for a follow-up. On his last visit, I asked him to start methotrexate 25 mg SubQ every Wednesday, which he has taken with good tolerance. Today, he feels good. He feels better. He reports intermittent \"scratch throat\" which is very mild compared to before. This has improved with SubQ. He denies joint, pain, swelling, or stiffness    He endorses chronic recurrent oral ulcers which is less than before. He denies fever, weight loss, blurred vision, vision loss, ankle swelling, dry cough, dyspnea, nausea, vomiting, dysphagia, abdominal pain, black or bloody stool, fall since last visit, rash, easy bruising and increased thirst.    Last toxicity monitoring by blood work was done on 6/10/2020 and did not reveal any significant adverse effects. Most recent inflammatory markers from 9/20/2019 revealed a ESR 12 mm/hr and CRP <1 mg/L. The patient has not had any interval hospital admissions, infections, or surgeries except hernia repair     REVIEW OF SYSTEMS    A comprehensive review of systems was performed and pertinent results are documented in the HPI, review of systems is otherwise non-contributory. PAST MEDICAL HISTORY    He has a past medical history of Autoimmune disease ([address] FAMILY HISTORY    His family history includes Dementia in his maternal grandmother; Heart Disease in his maternal grandfather and paternal grandfather; No Known Problems in his father, mother, and sister. SOCIAL HISTORY    He reports that he has never smoked. He has never used smokeless tobacco. He reports current alcohol use. He reports that he does not use drugs. MEDICATIONS    Current Outpatient Medications   Medication Sig Dispense Refill    [START ON 9/9/2020] methotrexate 25 mg/mL chemo injection 1 mL by SubCUTAneous route every Wednesday.  12 mL 1    folic acid (FOLVITE) 1 mg tablet TAKE 1 TABLET BY MOUTH EVERY DAY 90 Tab 1    omega 3-dha-epa-fish oil (FISH OIL) 100-160-1,000 mg cap Take  by mouth daily.  multivitamin (ONE A DAY) tablet Take 1 Tab by mouth daily.  calcium carbonate (OS-BARBARA) 500 mg calcium (1,250 mg) tablet Take  by mouth daily. ALLERGIES    No Known Allergies    PHYSICAL EXAMINATION    There were no vitals taken for this visit. There is no height or weight on file to calculate BMI. General: Patient is alert, oriented x 3, not in acute distress    HEENT:   Sclerae are not injected and appear moist.  There is no alopecia. Neck is supple     Chest:  Breathing comfortably at room air    Musculoskeletal exam:  A comprehensive musculoskeletal exam was NOT performed for all joints of each upper and lower extremity and assessed for swelling, tenderness and range of motion.  Positive results are documented as below:    Previous Exam    Bilateral ankle tenderness without swelling (RESOLVED)  No costochondral tenderness    Z-Deformities:   no  [place] Neck Deformities:  no  Boutonierre's Deformities:  no  Ulnar Deviation:   no  MCP Subluxation:  No  Costochondritis:  No   Dactylitis:   No   Enthesitis:   No      Joint Count 12/7/2019 9/20/2019 6/26/2019   Patient pain (0-100) - 20 40   MHAQ - 0 0   Left wrist- Tender 0 1 1   Left wrist- Swollen 0 0 1   Left thumb IP - Tender - - 1   Left thumb IP - Swollen - - 0   Left 2nd PIP - Tender - - 1   Left 2nd PIP - Swollen - - 1   Left 3rd PIP - Tender - - 1   Left 3rd PIP - Swollen - - 1   Left 4th PIP - Tender - - 1   Left 4th PIP - Swollen - - 1   Left 5th PIP - Tender - - 1   Left 5th PIP - Swollen - - 1   Right wrist- Tender - 1 1   Right wrist- Swollen - 1 1   Right 4th MCP - Tender - - 1   Right 4th MCP - Swollen - - 1   Right 5th MCP - Tender - - 1   Right 5th MCP - Swollen - - 1   Right thumb IP - Tender - - 1   Right thumb IP - Swollen - - 0   Right 2nd PIP - Tender - - 1   Right 2nd PIP - Swollen - - 1   Right 3rd PIP - Tender - - 1   Right 3rd PIP - Swollen - - 1   Right 5th PIP - Tender - - 1   Right 5th PIP - Swollen - - 0   Tender Joint Count (Total) 0 2 13   Swollen Joint Count (Total) 0 1 10   Physician Assessment (0-10) - 1 2   Patient Assessment (0-10) - 2 2   CDAI Total (calculated) - 6 27       DATA REVIEW    Laboratory     Recent laboratory results were reviewed, summarized, and discussed with the patient. Imaging    Musculoskeletal Ultrasound    None    Radiographs    Bilateral Hand 6/26/2019: LEFT: There is no acute fracture or bony erosion. The joint spacesare maintained. The soft tissues are unremarkable. RIGHT: There is no acute fracture or bony erosion. The joint spaces are maintained. The soft tissues are unremarkable. Sacroiliac Joint 6/26/2019: There is no acute fracture or bony erosion. The sacroiliac and hip joint spaces are maintained. There is no sclerosis adjacent to the sacroiliac joints.     Bilateral Foot 6/26/2019: LEFT: There is no acute fracture or bony erosion. The joint spaces are maintained. The soft tissues are unremarkable. RIGHT:There is no acute fracture or bony erosion. The joint spaces are maintained. The soft tissues are unremarkable. CT Imaging    None    MR Imaging    None    DXA     None    ASSESSMENT AND PLAN    This is a follow-up visit for Mr. Mireya Salgado. 1) Non-Radiographic Axial Spondylitis. This appears to have started after a preceding URI. He denies urinary or GI symptoms. He had intermittent low back stiffness which was not regular. He also reported intermittent sternal stiffness that is intermittent that would resolve after an hour. He endorsed a history of oral ulcers since childhood which seemed to have improved after starting methotrexate and folic acid. These raised the suspicion for a spondyloarthritis. He is maintained on methotrexate 25 mg every Wednesday with resolution of all his symptoms. He feels much better. His CDAI was previously N/A (previously 6, 27) with 0 tender and 0 swollen joints. I will continue treatment. I refilled his methotrexate    Labs ordered and mailed. He will see his PCP on Thursday, so I asked that her draw CBCD and CMP there. 2) Long Term Use of Immunosuppressants. The patient remains on immunomodulatory medications (methotrexate) and requires frequent toxicity monitoring by blood work. Respective labs were ordered (CBCD and CMP). 3) Recurrent URI. This has markedly improved without much recurrence like before    The patient voiced understanding of the aforementioned assessment and plan. The patient has consented for synchronous (real-time) Telemedicine (audio-video technology) on 9/8/2020 for their care to be delivered over telemedicine in place of their regularly scheduled office visit pursuant to the emergency declaration under the [facility] [address] waiver authority and the [employer] Act, this Virtual  Visit was conducted, with patient's consent, to reduce the patient's risk of exposure to COVID-19 and provide continuity of care for an established patient. Services were provided through a video synchronous discussion virtually to substitute for in-person clinic visit. TODAY'S ORDERS    Orders Placed This Encounter    CBC WITH AUTOMATED DIFF    METABOLIC PANEL, COMPREHENSIVE    methotrexate 25 mg/mL chemo injection    folic acid (FOLVITE) 1 mg tablet     No future appointments.   Amanda Pimentel MD, [address] [facility][note 3]    Adult Rheumatology   Rheumatology Ultrasound Certified  [address], [address] [facility]   Phone [phone]  Fax [phone]

## 2020-10-09 DIAGNOSIS — M45.A0 NON-RADIOGRAPHIC AXIAL SPONDYLOARTHRITIS (HCC): ICD-10-CM

## 2020-10-09 DIAGNOSIS — Z79.60 LONG-TERM USE OF IMMUNOSUPPRESSANT MEDICATION: ICD-10-CM

## 2020-10-12 RX ORDER — METHOTREXATE 25 MG/ML
25 INJECTION, SOLUTION INTRA-ARTERIAL; INTRAMUSCULAR; INTRAVENOUS
Qty: 12 VIAL | Refills: 1 | Status: SHIPPED | OUTPATIENT
Start: 2020-10-14 | End: 2021-05-04 | Stop reason: SDUPTHER

## 2021-03-08 ENCOUNTER — VIRTUAL VISIT (OUTPATIENT)
Dept: RHEUMATOLOGY | Age: 36
End: 2021-03-08
Payer: COMMERCIAL

## 2021-03-08 DIAGNOSIS — M45.A0 NON-RADIOGRAPHIC AXIAL SPONDYLOARTHRITIS (HCC): Primary | ICD-10-CM

## 2021-03-08 DIAGNOSIS — Z79.60 LONG-TERM USE OF IMMUNOSUPPRESSANT MEDICATION: ICD-10-CM

## 2021-03-08 PROCEDURE — 99215 OFFICE O/P EST HI 40 MIN: CPT | Performed by: INTERNAL MEDICINE

## 2021-03-08 NOTE — PROGRESS NOTES
REASON FOR VISIT    This is a follow-up visit for Mr. 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 Services were provided through a video synchronous discussion virtually to substitute for in-person clinic visit. Inflammatory arthritis phenotype includes:  Anti-CCP positive: no  Rheumatoid factor positive: no  HLA-B27 positive: yes  Inflammatory Back Pain: yes  Erosive disease: no  Sacroiliitis: no  Ankylosis: no  Psoriasis: no  Enthesitis: no  Dactylitis: no  Nail Pitting: no  Onycholysis: no  Splinter Hemorrhage: no  Extra-articular manifestations include: sternal pain (costochondritis), oral painful ulcers (since childhood)  SAPHO: no    Immunosuppression Screening (6/26/2019): Quantiferon TB: negative  PPD:  Not performed  Hepatitis B: negative  Hepatitis C: negative    Therapy History includes:  Current DMARD therapy include: methotrexate 25 mg SubQ every Wednesday (6/10/2020 to present)  Prior DMARD therapy include: methotrexate 20 mg every Saturday (7/01/2019 to 6/10/2020)  Discontinued DMARDs because of inefficacy: None  Discontinued DMARDs because of side effects: None    HISTORY OF PRESENT ILLNESS    Mr. Scarlett Dalton returns for a follow-up. On his last visit, I continued methotrexate 25 mg SubQ every Wednesday, which he has taken with good tolerance.  I ordered labs which not done at his PCP's, but I do not have the results     Today, he feels good. He denies any joint, pain, swelling, or stiffness. He no longer has any sore throat episodes. He has having prostate issues since late 2020 and following with urology and has been on antibiotics with mild improvement. He is on levofloxacin 500 mg daily for 10 days and then 250 mg daily for 10 days. He denies fever, weight loss, blurred vision, vision loss, oral ulcers, ankle swelling, dry cough, dyspnea, nausea, vomiting, dysphagia, abdominal pain, black or bloody stool, fall since last visit, rash, easy bruising and increased thirst.    Most recent toxicity monitoring by blood work was done on 9/11/2020 and did not reveal any significant adverse effects. Most recent inflammatory markers from 9/20/2019 revealed a ESR 12 mm/hr and CRP <1 mg/L. I have reviewed and interpreted the following test, CBCD, CMP, performed by Dr. Michel Perez on 9/11/2020, which showed no abnormalities. I reviewed the patient's interval medical history, surgical history, medications, and allergies. The patient has not had any interval hospital admissions, infections, or surgeries. REVIEW OF SYSTEMS    A comprehensive review of systems was performed and pertinent results are documented in the HPI, review of systems is otherwise non-contributory. PAST MEDICAL HISTORY    He has a past medical history of Autoimmune disease ([facility] [address] FAMILY HISTORY    His family history includes Dementia in his maternal grandmother; Heart Disease in his maternal grandfather and paternal grandfather; No Known Problems in his father, mother, and sister. SOCIAL HISTORY    He reports that he has never smoked. He has never used smokeless tobacco. He reports current alcohol use. He reports that he does not use drugs. MEDICATIONS    Current Outpatient Medications   Medication Sig    methotrexate 25 mg/mL chemo injection 1 mL by SubCUTAneous route every Wednesday.     folic acid (FOLVITE) 1 mg tablet TAKE 1 TABLET BY MOUTH EVERY DAY    omega 3-dha-epa-fish oil (FISH OIL) 100-160-1,000 mg cap Take  by mouth daily.  multivitamin (ONE A DAY) tablet Take 1 Tab by mouth daily.  calcium carbonate (OS-BARBARA) 500 mg calcium (1,250 mg) tablet Take  by mouth daily. No current facility-administered medications for this visit. ALLERGIES    No Known Allergies    PHYSICAL EXAMINATION    There were no vitals taken for this visit. There is no height or weight on file to calculate BMI. General: Patient is alert, oriented x 3, not in acute distress    HEENT:   Sclerae are not injected and appear moist.  There is no alopecia. Chest:  Breathing comfortably at room air    Musculoskeletal exam:  A comprehensive musculoskeletal exam was NOT performed for all joints of each upper and lower extremity and assessed for swelling, tenderness and range of motion.  Positive results are documented as below:    Previous Exam    Bilateral ankle tenderness without swelling (RESOLVED)  No costochondral tenderness    Z-Deformities:   no  [place] Neck Deformities:  no  Boutonierre's Deformities:  no  Ulnar Deviation:   no  MCP Subluxation:  No  Costochondritis:  No   Dactylitis:   No   Enthesitis:   No      Joint Count 12/7/2019 9/20/2019 6/26/2019   Patient pain (0-100) - 20 40   MHAQ - 0 0   Left wrist- Tender 0 1 1   Left wrist- Swollen 0 0 1   Left thumb IP - Tender - - 1   Left thumb IP - Swollen - - 0   Left 2nd PIP - Tender - - 1   Left 2nd PIP - Swollen - - 1   Left 3rd PIP - Tender - - 1   Left 3rd PIP - Swollen - - 1   Left 4th PIP - Tender - - 1   Left 4th PIP - Swollen - - 1   Left 5th PIP - Tender - - 1   Left 5th PIP - Swollen - - 1   Right wrist- Tender - 1 1   Right wrist- Swollen - 1 1   Right 4th MCP - Tender - - 1   Right 4th MCP - Swollen - - 1   Right 5th MCP - Tender - - 1   Right 5th MCP - Swollen - - 1   Right thumb IP - Tender - - 1   Right thumb IP - Swollen - - 0   Right 2nd PIP - Tender - - 1 Right 2nd PIP - Swollen - - 1   Right 3rd PIP - Tender - - 1   Right 3rd PIP - Swollen - - 1   Right 5th PIP - Tender - - 1   Right 5th PIP - Swollen - - 0   Tender Joint Count (Total) 0 2 13   Swollen Joint Count (Total) 0 1 10   Physician Assessment (0-10) - 1 2   Patient Assessment (0-10) - 2 2   CDAI Total (calculated) - 6 27       DATA REVIEW    Laboratory     Recent laboratory results were reviewed, summarized, and discussed with the patient. Imaging    Musculoskeletal Ultrasound    None    Radiographs    Bilateral Hand 6/26/2019: LEFT: There is no acute fracture or bony erosion. The joint spacesare maintained. The soft tissues are unremarkable. RIGHT: There is no acute fracture or bony erosion. The joint spaces are maintained. The soft tissues are unremarkable. Sacroiliac Joint 6/26/2019: There is no acute fracture or bony erosion. The sacroiliac and hip joint spaces are maintained. There is no sclerosis adjacent to the sacroiliac joints.     Bilateral Foot 6/26/2019: LEFT: There is no acute fracture or bony erosion. The joint spaces are maintained. The soft tissues are unremarkable. RIGHT:There is no acute fracture or bony erosion. The joint spaces are maintained. The soft tissues are unremarkable. CT Imaging    None    MR Imaging    None    DXA     None    ASSESSMENT AND PLAN    This is a follow-up visit for Mr. Tammie Teague. 1) Non-Radiographic Axial Spondylitis. This appeared to have started after a preceding URI. He denies urinary or GI symptoms. He had intermittent low back stiffness which was not regular. He also reported intermittent sternal stiffness that is intermittent that would resolve after an hour. He endorsed a history of oral ulcers since childhood which seemed to have improved after starting methotrexate and folic acid. These raised the suspicion for a spondyloarthritis.     He is maintained on methotrexate 25 mg SubQ every Wednesday with resolution of all his symptoms (pharngitis, arthralgia, oral ulcers, costochondritis)     His CDAI was previously N/A (previously 6, 27) with 0 tender and 0 swollen joints. I will continue treatment. Labs ordered and mailed. 2) Long Term Use of Immunosuppressants. The patient remains on high risk immunomodulatory medications (methotrexate) and requires frequent toxicity monitoring by blood work to evaluate for toxicities. The patient's most recent labs on 6/10/2020, which I reviewed with them, were unremarkable. Respective labs were ordered (CBC and CMP) and lab slip mailed. 3) Recurrent URI. This has resolved. 4) Recurrent Prostatitis. I asked him to hold methotrexate while on treatment for this. The patient voiced understanding of the aforementioned assessment and plan. The patient has consented for synchronous (real-time) Telemedicine (audio-video technology) on 3/8/2021 for their care to be delivered over telemedicine in place of their regularly scheduled office visit pursuant to the emergency declaration under the [facility], [facility] waiver authority and the [employer] Act, this Virtual  Visit was conducted, with patient's consent, to reduce the patient's risk of exposure to COVID-19 and provide continuity of care for an established patient. A total of 40 minutes was spent on this visit, reviewing interval notes, interval testing results, ordering tests, refilling medications, documenting the findings in the note, patient education, counseling, and coordination of care as described above. All questions asked and answered. Services were provided through a video synchronous discussion virtually to substitute for in-person clinic visit.     TODAY'S ORDERS    Orders Placed This Encounter    CBC WITH AUTOMATED DIFF    METABOLIC PANEL, COMPREHENSIVE    C REACTIVE PROTEIN, QT    SED RATE (ESR)    METHOTREXATE POLYGLUTAMATES     Future Appointments   Date Time Provider Jorge Garrett   8/9/2021  9:40 AM Masri, Merlin Notice, MD AOCR BS AMB     Lucho Lew MD, [address] [facility]    Adult Rheumatology   Rheumatology Ultrasound Certified  [address] [facility] [address]  [facility], [address] [place] [address], [address] [place] [address]   Phone [phone]  Fax [phone]

## 2021-03-26 LAB
ALBUMIN SERPL-MCNC: 4.6 G/DL (ref 4–5)
ALBUMIN/GLOB SERPL: 1.8 {RATIO} (ref 1.2–2.2)
ALP SERPL-CCNC: 73 IU/L (ref 39–117)
ALT SERPL-CCNC: 33 IU/L (ref 0–44)
AST SERPL-CCNC: 29 IU/L (ref 0–40)
BASOPHILS # BLD AUTO: 0 X10E3/UL (ref 0–0.2)
BASOPHILS NFR BLD AUTO: 1 %
BILIRUB SERPL-MCNC: 0.6 MG/DL (ref 0–1.2)
BUN SERPL-MCNC: 12 MG/DL (ref 6–20)
BUN/CREAT SERPL: 13 (ref 9–20)
CALCIUM SERPL-MCNC: 10 MG/DL (ref 8.7–10.2)
CHLORIDE SERPL-SCNC: 104 MMOL/L (ref 96–106)
CO2 SERPL-SCNC: 29 MMOL/L (ref 20–29)
CREAT SERPL-MCNC: 0.93 MG/DL (ref 0.76–1.27)
CRP SERPL-MCNC: <1 MG/L (ref 0–10)
EOSINOPHIL # BLD AUTO: 0 X10E3/UL (ref 0–0.4)
EOSINOPHIL NFR BLD AUTO: 1 %
ERYTHROCYTE [DISTWIDTH] IN BLOOD BY AUTOMATED COUNT: 14 % (ref 11.6–15.4)
ERYTHROCYTE [SEDIMENTATION RATE] IN BLOOD BY WESTERGREN METHOD: 6 MM/HR (ref 0–15)
GLOBULIN SER CALC-MCNC: 2.5 G/DL (ref 1.5–4.5)
GLUCOSE SERPL-MCNC: 87 MG/DL (ref 65–99)
HCT VFR BLD AUTO: 45.3 % (ref 37.5–51)
HGB BLD-MCNC: 15.1 G/DL (ref 13–17.7)
IMM GRANULOCYTES # BLD AUTO: 0 X10E3/UL (ref 0–0.1)
IMM GRANULOCYTES NFR BLD AUTO: 0 %
LYMPHOCYTES # BLD AUTO: 1.5 X10E3/UL (ref 0.7–3.1)
LYMPHOCYTES NFR BLD AUTO: 35 %
MCH RBC QN AUTO: 30 PG (ref 26.6–33)
MCHC RBC AUTO-ENTMCNC: 33.3 G/DL (ref 31.5–35.7)
MCV RBC AUTO: 90 FL (ref 79–97)
METHOTREXATE PG1: 19.6 NMOL/L RBC
METHOTREXATE PG2: 24.84 NMOL/L RBC
METHOTREXATE PG3: 106.37 NMOL/L RBC
METHOTREXATE PG4: 60.66 NMOL/L RBC
METHOTREXATE PG5: 26.15 NMOL/L RBC
METHOTREXATE-PG TOTAL: 237.63 NMOL/L RBC
MONOCYTES # BLD AUTO: 0.5 X10E3/UL (ref 0.1–0.9)
MONOCYTES NFR BLD AUTO: 12 %
MTXPGSRA INTERPRETATION: NORMAL
NEUTROPHILS # BLD AUTO: 2.2 X10E3/UL (ref 1.4–7)
NEUTROPHILS NFR BLD AUTO: 51 %
PLATELET # BLD AUTO: 247 X10E3/UL (ref 150–450)
POTASSIUM SERPL-SCNC: 4.4 MMOL/L (ref 3.5–5.2)
PROT SERPL-MCNC: 7.1 G/DL (ref 6–8.5)
RBC # BLD AUTO: 5.03 X10E6/UL (ref 4.14–5.8)
SODIUM SERPL-SCNC: 143 MMOL/L (ref 134–144)
WBC # BLD AUTO: 4.3 X10E3/UL (ref 3.4–10.8)

## 2021-04-26 DIAGNOSIS — M45.A0 NON-RADIOGRAPHIC AXIAL SPONDYLOARTHRITIS (HCC): ICD-10-CM

## 2021-04-27 RX ORDER — SYRINGE-NEEDLE,INSULIN,0.5 ML 30 GX5/16"
1 SYRINGE, EMPTY DISPOSABLE MISCELLANEOUS
Qty: 10 SYRINGE | Refills: 4 | Status: SHIPPED | OUTPATIENT
Start: 2021-05-01 | End: 2021-07-18

## 2021-05-04 DIAGNOSIS — Z79.60 LONG-TERM USE OF IMMUNOSUPPRESSANT MEDICATION: ICD-10-CM

## 2021-05-04 DIAGNOSIS — M45.A0 NON-RADIOGRAPHIC AXIAL SPONDYLOARTHRITIS (HCC): ICD-10-CM

## 2021-05-04 RX ORDER — METHOTREXATE 25 MG/ML
25 INJECTION, SOLUTION INTRA-ARTERIAL; INTRAMUSCULAR; INTRAVENOUS
Qty: 12 VIAL | Refills: 1 | Status: SHIPPED | OUTPATIENT
Start: 2021-05-05 | End: 2021-05-06 | Stop reason: SDUPTHER

## 2021-05-06 DIAGNOSIS — M45.A0 NON-RADIOGRAPHIC AXIAL SPONDYLOARTHRITIS (HCC): ICD-10-CM

## 2021-05-06 DIAGNOSIS — Z79.60 LONG-TERM USE OF IMMUNOSUPPRESSANT MEDICATION: ICD-10-CM

## 2021-05-06 RX ORDER — METHOTREXATE 25 MG/ML
25 INJECTION, SOLUTION INTRA-ARTERIAL; INTRAMUSCULAR; INTRAVENOUS
Qty: 12 VIAL | Refills: 1
Start: 2021-05-12 | End: 2021-09-27

## 2021-05-12 ENCOUNTER — HOSPITAL ENCOUNTER (EMERGENCY)
Age: 36
Discharge: HOME | End: 2021-05-12
Attending: STUDENT IN AN ORGANIZED HEALTH CARE EDUCATION/TRAINING PROGRAM
Payer: COMMERCIAL

## 2021-05-12 ENCOUNTER — APPOINTMENT (OUTPATIENT)
Dept: GENERAL RADIOLOGY | Age: 36
End: 2021-05-12
Attending: PHYSICIAN ASSISTANT
Payer: COMMERCIAL

## 2021-05-12 ENCOUNTER — APPOINTMENT (OUTPATIENT)
Dept: GENERAL RADIOLOGY | Age: 36
End: 2021-05-12
Attending: STUDENT IN AN ORGANIZED HEALTH CARE EDUCATION/TRAINING PROGRAM
Payer: COMMERCIAL

## 2021-05-12 VITALS
WEIGHT: 182.1 LBS | OXYGEN SATURATION: 97 % | RESPIRATION RATE: 11 BRPM | HEART RATE: 61 BPM | TEMPERATURE: 97.6 F | BODY MASS INDEX: 29.27 KG/M2 | SYSTOLIC BLOOD PRESSURE: 119 MMHG | HEIGHT: 66 IN | DIASTOLIC BLOOD PRESSURE: 76 MMHG

## 2021-05-12 DIAGNOSIS — S53.105A DISLOCATION OF LEFT ELBOW, INITIAL ENCOUNTER: Primary | ICD-10-CM

## 2021-05-12 PROBLEM — S53.115A: Status: ACTIVE | Noted: 2021-05-12

## 2021-05-12 LAB
COMMENT, HOLDF: NORMAL
SAMPLES BEING HELD,HOLD: NORMAL

## 2021-05-12 PROCEDURE — 73070 X-RAY EXAM OF ELBOW: CPT

## 2021-05-12 PROCEDURE — 96374 THER/PROPH/DIAG INJ IV PUSH: CPT

## 2021-05-12 PROCEDURE — 75810000301 HC ER LEVEL 1 CLOSED TREATMNT FRACTURE/DISLOCATION

## 2021-05-12 PROCEDURE — 73100 X-RAY EXAM OF WRIST: CPT

## 2021-05-12 PROCEDURE — 73080 X-RAY EXAM OF ELBOW: CPT

## 2021-05-12 PROCEDURE — 99285 EMERGENCY DEPT VISIT HI MDM: CPT

## 2021-05-12 PROCEDURE — 96376 TX/PRO/DX INJ SAME DRUG ADON: CPT

## 2021-05-12 PROCEDURE — 74011250636 HC RX REV CODE- 250/636: Performed by: STUDENT IN AN ORGANIZED HEALTH CARE EDUCATION/TRAINING PROGRAM

## 2021-05-12 PROCEDURE — 36415 COLL VENOUS BLD VENIPUNCTURE: CPT

## 2021-05-12 PROCEDURE — 96375 TX/PRO/DX INJ NEW DRUG ADDON: CPT

## 2021-05-12 RX ORDER — FENTANYL CITRATE 50 UG/ML
50 INJECTION, SOLUTION INTRAMUSCULAR; INTRAVENOUS ONCE
Status: COMPLETED | OUTPATIENT
Start: 2021-05-12 | End: 2021-05-12

## 2021-05-12 RX ORDER — OXYCODONE AND ACETAMINOPHEN 5; 325 MG/1; MG/1
1 TABLET ORAL
Qty: 12 TAB | Refills: 0 | Status: SHIPPED | OUTPATIENT
Start: 2021-05-12 | End: 2021-05-15

## 2021-05-12 RX ORDER — PROPOFOL 10 MG/ML
1 INJECTION, EMULSION INTRAVENOUS
Status: COMPLETED | OUTPATIENT
Start: 2021-05-12 | End: 2021-05-12

## 2021-05-12 RX ORDER — ONDANSETRON 2 MG/ML
4 INJECTION INTRAMUSCULAR; INTRAVENOUS
Status: COMPLETED | OUTPATIENT
Start: 2021-05-12 | End: 2021-05-12

## 2021-05-12 RX ORDER — OXYCODONE AND ACETAMINOPHEN 5; 325 MG/1; MG/1
1 TABLET ORAL
Qty: 12 TAB | Refills: 0 | Status: SHIPPED | OUTPATIENT
Start: 2021-05-12 | End: 2021-05-12 | Stop reason: SDUPTHER

## 2021-05-12 RX ADMIN — FENTANYL CITRATE 50 MCG: 50 INJECTION, SOLUTION INTRAMUSCULAR; INTRAVENOUS at 19:33

## 2021-05-12 RX ADMIN — ONDANSETRON 4 MG: 2 INJECTION INTRAMUSCULAR; INTRAVENOUS at 20:42

## 2021-05-12 RX ADMIN — PROPOFOL 82.6 MG: 10 INJECTION, EMULSION INTRAVENOUS at 20:40

## 2021-05-12 RX ADMIN — FENTANYL CITRATE 50 MCG: 50 INJECTION, SOLUTION INTRAMUSCULAR; INTRAVENOUS at 20:42
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
## 2021-05-13 NOTE — PROCEDURES
Joint Reduction Procedural Note      Preprocedural Diagnosis: Left elbow dislocation  Postprocedural Diagnosis:  Left elbow dislocation    Provider: MARYSOL Sierra     Anesthesia: Conscious sedation administered by Dr. Evette Cueto    Allergy: No Known Allergies    Procedure Details: The risks,benefits and alternatives of a fracture reduction were explained and consent was obtained for the procedure. Closed reduction of left elbow using traction and countertraction. Clinical improvement noted. Pt. Tolerated procedure well w/o complications. Posterior elbow splint placed. Pt. And Family educated on neurovascular compromise and compartment syndrome. Pt. Neurovascularly intact with sensation intact and capillary refill <2secs p procedure in left hand. Pt. Awake and alert. Complications:  None; patient tolerated the procedure well.           Signed By: Blanche Batista, [address]  5/12/21  9:00pm

## 2021-05-13 NOTE — DISCHARGE INSTRUCTIONS
USE CAUTION TAKING THE PAIN MEDICATION AS IT CAN CAUSE DROWSINESS--NO DRIVING WHILE TAKING IT. DO NOT MIX IT WITH TYLENOL AS IT HAS TYLENOL IN IT      RETURN IF WORSENING PAIN, CHANGE IN COLOR, CHANGE IN TEMPERATURE, OR LOSS OF SENSATION IN THE AFFECTED EXTREMITY

## 2021-08-09 ENCOUNTER — VIRTUAL VISIT (OUTPATIENT)
Dept: RHEUMATOLOGY | Age: 36
End: 2021-08-09
Payer: COMMERCIAL

## 2021-08-09 DIAGNOSIS — M45.A0 NON-RADIOGRAPHIC AXIAL SPONDYLOARTHRITIS (HCC): Primary | ICD-10-CM

## 2021-08-09 DIAGNOSIS — Z79.60 LONG-TERM USE OF IMMUNOSUPPRESSANT MEDICATION: ICD-10-CM

## 2021-08-09 PROCEDURE — 99214 OFFICE O/P EST MOD 30 MIN: CPT | Performed by: INTERNAL MEDICINE
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
## 2021-09-23 DIAGNOSIS — M45.A0 NON-RADIOGRAPHIC AXIAL SPONDYLOARTHRITIS (HCC): ICD-10-CM

## 2021-09-23 DIAGNOSIS — Z79.60 LONG-TERM USE OF IMMUNOSUPPRESSANT MEDICATION: ICD-10-CM

## 2021-09-27 RX ORDER — METHOTREXATE 25 MG/ML
INJECTION, SOLUTION INTRA-ARTERIAL; INTRAMUSCULAR; INTRAVENOUS
Qty: 12 ML | Refills: 1 | Status: SHIPPED | OUTPATIENT
Start: 2021-09-27 | End: 2021-10-11 | Stop reason: SDUPTHER

## 2021-10-11 DIAGNOSIS — Z79.60 LONG-TERM USE OF IMMUNOSUPPRESSANT MEDICATION: ICD-10-CM

## 2021-10-11 DIAGNOSIS — M45.A0 NON-RADIOGRAPHIC AXIAL SPONDYLOARTHRITIS (HCC): ICD-10-CM

## 2021-10-11 RX ORDER — METHOTREXATE 25 MG/ML
INJECTION, SOLUTION INTRA-ARTERIAL; INTRAMUSCULAR; INTRAVENOUS
Qty: 12 ML | Refills: 1 | Status: SHIPPED | OUTPATIENT
Start: 2021-10-11 | End: 2021-10-13 | Stop reason: SDUPTHER

## 2021-10-13 DIAGNOSIS — M45.A0 NON-RADIOGRAPHIC AXIAL SPONDYLOARTHRITIS (HCC): ICD-10-CM

## 2021-10-13 DIAGNOSIS — Z79.60 LONG-TERM USE OF IMMUNOSUPPRESSANT MEDICATION: ICD-10-CM

## 2021-10-13 RX ORDER — METHOTREXATE 25 MG/ML
INJECTION, SOLUTION INTRA-ARTERIAL; INTRAMUSCULAR; INTRAVENOUS
Qty: 12 ML | Refills: 1 | Status: SHIPPED | OUTPATIENT
Start: 2021-10-13 | End: 2021-11-16 | Stop reason: SDUPTHER

## 2021-11-16 ENCOUNTER — VIRTUAL VISIT (OUTPATIENT)
Dept: RHEUMATOLOGY | Age: 36
End: 2021-11-16
Payer: COMMERCIAL

## 2021-11-16 DIAGNOSIS — Z79.60 LONG-TERM USE OF IMMUNOSUPPRESSANT MEDICATION: ICD-10-CM

## 2021-11-16 DIAGNOSIS — K12.1 ORAL ULCER: ICD-10-CM

## 2021-11-16 DIAGNOSIS — M45.A0 NON-RADIOGRAPHIC AXIAL SPONDYLOARTHRITIS (HCC): Primary | ICD-10-CM

## 2021-11-16 PROCEDURE — 99214 OFFICE O/P EST MOD 30 MIN: CPT | Performed by: INTERNAL MEDICINE

## 2021-11-16 RX ORDER — METHOTREXATE 25 MG/ML
INJECTION, SOLUTION INTRA-ARTERIAL; INTRAMUSCULAR; INTRAVENOUS
Qty: 12 ML | Refills: 1
Start: 2021-11-16 | End: 2022-02-14 | Stop reason: SDUPTHER

## 2021-11-16 RX ORDER — COLCHICINE 0.6 MG/1
0.6 TABLET ORAL 2 TIMES DAILY
Qty: 180 TABLET | Refills: 3 | Status: SHIPPED | OUTPATIENT
Start: 2021-11-16 | End: 2022-02-14 | Stop reason: SDUPTHER
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
## 2021-12-03 LAB
ALBUMIN SERPL-MCNC: 4.7 G/DL (ref 4–5)
ALBUMIN/GLOB SERPL: 2 {RATIO} (ref 1.2–2.2)
ALP SERPL-CCNC: 80 IU/L (ref 44–121)
ALT SERPL-CCNC: 24 IU/L (ref 0–44)
AST SERPL-CCNC: 27 IU/L (ref 0–40)
BASOPHILS # BLD AUTO: 0 X10E3/UL (ref 0–0.2)
BASOPHILS NFR BLD AUTO: 1 %
BILIRUB SERPL-MCNC: 0.6 MG/DL (ref 0–1.2)
BUN SERPL-MCNC: 12 MG/DL (ref 6–20)
BUN/CREAT SERPL: 15 (ref 9–20)
CALCIUM SERPL-MCNC: 9.9 MG/DL (ref 8.7–10.2)
CHLORIDE SERPL-SCNC: 104 MMOL/L (ref 96–106)
CO2 SERPL-SCNC: 26 MMOL/L (ref 20–29)
CREAT SERPL-MCNC: 0.8 MG/DL (ref 0.76–1.27)
EOSINOPHIL # BLD AUTO: 0 X10E3/UL (ref 0–0.4)
EOSINOPHIL NFR BLD AUTO: 1 %
ERYTHROCYTE [DISTWIDTH] IN BLOOD BY AUTOMATED COUNT: 13.8 % (ref 11.6–15.4)
GLOBULIN SER CALC-MCNC: 2.3 G/DL (ref 1.5–4.5)
GLUCOSE SERPL-MCNC: 86 MG/DL (ref 65–99)
HCT VFR BLD AUTO: 42.3 % (ref 37.5–51)
HGB BLD-MCNC: 14.6 G/DL (ref 13–17.7)
IMM GRANULOCYTES # BLD AUTO: 0 X10E3/UL (ref 0–0.1)
IMM GRANULOCYTES NFR BLD AUTO: 0 %
LYMPHOCYTES # BLD AUTO: 1.6 X10E3/UL (ref 0.7–3.1)
LYMPHOCYTES NFR BLD AUTO: 41 %
MCH RBC QN AUTO: 30.2 PG (ref 26.6–33)
MCHC RBC AUTO-ENTMCNC: 34.5 G/DL (ref 31.5–35.7)
MCV RBC AUTO: 88 FL (ref 79–97)
METHOTREXATE PG1: 43 NMOL/L RBC
METHOTREXATE PG2: 29 NMOL/L RBC
METHOTREXATE PG3: 88 NMOL/L RBC
METHOTREXATE PG4: 47 NMOL/L RBC
METHOTREXATE PG5: 22 NMOL/L RBC
METHOTREXATE-PG TOTAL: 228 NMOL/L RBC
MONOCYTES # BLD AUTO: 0.4 X10E3/UL (ref 0.1–0.9)
MONOCYTES NFR BLD AUTO: 11 %
MTXPGSRA INTERPRETATION: NORMAL
NEUTROPHILS # BLD AUTO: 1.8 X10E3/UL (ref 1.4–7)
NEUTROPHILS NFR BLD AUTO: 46 %
PLATELET # BLD AUTO: 264 X10E3/UL (ref 150–450)
POTASSIUM SERPL-SCNC: 4 MMOL/L (ref 3.5–5.2)
PROT SERPL-MCNC: 7 G/DL (ref 6–8.5)
RBC # BLD AUTO: 4.83 X10E6/UL (ref 4.14–5.8)
SODIUM SERPL-SCNC: 143 MMOL/L (ref 134–144)
WBC # BLD AUTO: 3.9 X10E3/UL (ref 3.4–10.8)

## 2022-01-21 ENCOUNTER — TELEPHONE (OUTPATIENT)
Dept: RHEUMATOLOGY | Age: 37
End: 2022-01-21

## 2022-02-14 ENCOUNTER — OFFICE VISIT (OUTPATIENT)
Dept: RHEUMATOLOGY | Age: 37
End: 2022-02-14
Payer: COMMERCIAL

## 2022-02-14 VITALS
BODY MASS INDEX: 28.06 KG/M2 | RESPIRATION RATE: 18 BRPM | WEIGHT: 174.6 LBS | HEIGHT: 66 IN | OXYGEN SATURATION: 95 % | HEART RATE: 65 BPM | DIASTOLIC BLOOD PRESSURE: 76 MMHG | SYSTOLIC BLOOD PRESSURE: 121 MMHG | TEMPERATURE: 97.4 F

## 2022-02-14 DIAGNOSIS — L40.50 PSORIATIC ARTHRITIS (HCC): Primary | ICD-10-CM

## 2022-02-14 DIAGNOSIS — Z11.59 ENCOUNTER FOR SCREENING FOR VIRAL DISEASE: ICD-10-CM

## 2022-02-14 DIAGNOSIS — Z79.899 ONGOING USE OF POSSIBLY TOXIC MEDICATION: ICD-10-CM

## 2022-02-14 DIAGNOSIS — Z79.60 LONG-TERM USE OF IMMUNOSUPPRESSANT MEDICATION: ICD-10-CM

## 2022-02-14 DIAGNOSIS — K12.1 ORAL ULCER: ICD-10-CM

## 2022-02-14 PROCEDURE — 99215 OFFICE O/P EST HI 40 MIN: CPT | Performed by: INTERNAL MEDICINE

## 2022-02-14 RX ORDER — METHOTREXATE 25 MG/ML
20 INJECTION, SOLUTION INTRA-ARTERIAL; INTRAMUSCULAR; INTRAVENOUS
Qty: 12 ML | Refills: 0 | Status: SHIPPED | OUTPATIENT
Start: 2022-02-16 | End: 2022-05-11 | Stop reason: SDUPTHER

## 2022-02-14 RX ORDER — COLCHICINE 0.6 MG/1
0.6 TABLET ORAL DAILY
Qty: 90 TABLET | Refills: 1 | Status: SHIPPED | OUTPATIENT
Start: 2022-02-14

## 2022-02-14 NOTE — PROGRESS NOTES
REASON FOR VISIT    This is an in-office follow-up visit for Mr. Antony Pedro for     ICD-10-CM   1. 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oarthritis ([facility] M46.90       Inflammatory arthritis phenotype includes:  Anti-CCP positive: no  Rheumatoid factor positive: no  HLA-B27 positive: yes  Inflammatory Back Pain: yes  Erosive disease: no  Sacroiliitis: no  Ankylosis: no  Psoriasis: no  Enthesitis: no  Dactylitis: no  Nail Pitting: no  Onycholysis: no  Splinter Hemorrhage: no  Extra-articular manifestations include: sternal pain (costochondritis), oral painful/painless ulcers (since childhood)  SAPHO: no    Immunosuppression Screening (6/26/2019): Quantiferon TB: negative  PPD:  Not performed  Hepatitis B: negative  Hepatitis C: negative    Therapy History includes:  Current DMARD therapy include: methotrexate 25 mg SubQ every Wednesday (6/10/2020 to present)  Prior DMARD therapy include: methotrexate 20 mg every Saturday (7/01/2019 to 6/10/2020)  Discontinued DMARDs because of inefficacy: None  Discontinued DMARDs because of side effects: None    HISTORY OF PRESENT ILLNESS    Mr. Antony Pedro returns for a follow-up. Now if he gets a flare of left ankle, left wrist, or right ankle pain now, it's a 1-2 in severity. No recent sore throat. Has had recurrent oral ulcers his whole life. Has had one     No GI upset with methotrexate and colchicine, possibly increased frequency of BMs. Always tends toward looser stools, can have some tenesmus/fecal urgency but only 1-2x/week, usually triggered by particular foods (greasy or spicy). Mild stiffness in the low back if he's been less active, doesn't both him if he exercises regularly. Improves through the day. No interval infections requiring antibiotics. Will still have an episode of mild prostatitis once every couple of weeks, usually resolves spontaneously within 1-2 days. Has had milder acne into adulthood, but overall nothing scarring or severe.   Has had psoriasis on elbows that has resolved since starting methotrexate. No one in family has recurrent fevers, ulcers, or psoriasis. No sexual activity recently, no new partners over last 2-3 years. Follows with ophthalmology for occasional dry eye symptoms, says Schirmer is usually low end of normal, does well with inconsistent use of AT's. Since September, has had a \"chemical\" taste in the right side of the mouth, feels more frequently around the time he is taking his methotrexate. REVIEW OF SYSTEMS    A comprehensive review of systems was performed and pertinent results are documented in the HPI, review of systems is otherwise non-contributory. PAST MEDICAL HISTORY    He has a past medical history of Autoimmune disease ([address] FAMILY HISTORY    His family history includes Dementia in his maternal grandmother; Heart Disease in his maternal grandfather and paternal grandfather; No Known Problems in his father, mother, and sister. SOCIAL HISTORY    He reports that he has never smoked. He has never used smokeless tobacco. He reports current alcohol use. He reports that he does not use drugs. MEDICATIONS    Current Outpatient Medications   Medication Sig    colchicine 0.6 mg tablet Take 1 Tablet by mouth two (2) times a day.  methotrexate 25 mg/mL chemo injection INJECT 1ML AS DIRECTED EVERY WEDNESDAY    folic acid (FOLVITE) 1 mg tablet TAKE 1 TABLET BY MOUTH EVERY DAY    omega 3-dha-epa-fish oil (FISH OIL) 100-160-1,000 mg cap Take  by mouth daily.  multivitamin (ONE A DAY) tablet Take 1 Tab by mouth daily.  calcium carbonate (OS-BARBARA) 500 mg calcium (1,250 mg) tablet Take  by mouth daily. No current facility-administered medications for this visit.      ALLERGIES    No Known Allergies    PHYSICAL EXAMINATION    Visit Vitals  /76 (BP 1 Location: Left upper arm, BP Patient Position: Sitting)   Pulse 65   Temp 97.4 °F (36.3 °C) (Oral)   Resp 18   Ht 5' 6\" (1.676 m)   Wt 174 lb 9.6 oz (79.2 kg) SpO2 95%   BMI 28.18 kg/m²     Body mass index is 28.18 kg/m². General:  The patient is well developed, well nourished, alert, and in no apparent distress. Eyes: Sclera are anicteric. No conjunctival injection. HEENT:  Oropharynx is clear. No oral ulcers. Adequate salivary pooling. No cervical or supraclavicular lymphadenopathy. Lungs:  Clear to auscultation bilaterally, without wheeze or stridor. Normal respiratory effort. Cor:  Regular rate and rhythm. No murmur rub or gallop. Abdomen: Soft, non-tender, without hepatomegaly or masses. Extremities: No calf tenderness or edema. Warm and well perfused. Skin:  No significant abnormalities. Neuro: Nonfocal  Musculoskeletal:    A comprehensive musculoskeletal exam was performed for all joints of each upper and lower extremity and assessed for swelling, tenderness and range of motion. Results are documented as below:  No evidence of synovitis in the small joints of the hands, wrists, shoulders, elbows, hips, knees or ankles. DATA REVIEW    Laboratory     Recent laboratory results were reviewed, summarized, and discussed with the patient. 11/24/21: CBC WNL; Cr 0.80, LFTs WNL      Imaging    Musculoskeletal Ultrasound    None    Radiographs    Bilateral Hand 6/26/2019: LEFT: There is no acute fracture or bony erosion. The joint spacesare maintained. The soft tissues are unremarkable. RIGHT: There is no acute fracture or bony erosion. The joint spaces are maintained. The soft tissues are unremarkable. Sacroiliac Joint 6/26/2019: There is no acute fracture or bony erosion. The sacroiliac and hip joint spaces are maintained. There is no sclerosis adjacent to the sacroiliac joints.     Bilateral Foot 6/26/2019: LEFT: There is no acute fracture or bony erosion. The joint spaces are maintained. The soft tissues are unremarkable. RIGHT: There is no acute fracture or bony erosion. The joint spaces are maintained.  The soft tissues are unremarkable. CT Imaging    None    MR Imaging    None    DXA     None    ASSESSMENT AND PLAN    This is a follow-up visit for Mr. Devan Banks for likely psoriatic arthritis with peripheral arthralgias, oral aphthosis, and costochondritis which are in clinical remission on a regimen of methotrexate and colchicine. Will start gradual tapering of both. 1. Psoriatic arthritis ([facility] [address]  - HEPATITIS B EVALUATION; Future  - HEPATITIS C AB; Future  - C REACTIVE PROTEIN, QT; Future  - CBC WITH AUTOMATED DIFF; Future  - METABOLIC PANEL, COMPREHENSIVE; Future  - SED RATE (ESR); Future  - methotrexate 25 mg/mL chemo injection; 0.8 mL by SubCUTAneous route every Wednesday. Dispense: 12 mL; Refill: 0    2. Encounter for screening for viral disease  - HEPATITIS B EVALUATION; Future  - HEPATITIS C AB; Future    3. Ongoing use of possibly toxic medication  - CBC WITH AUTOMATED DIFF; Future  - METABOLIC PANEL, COMPREHENSIVE; Future    4. Oral ulcer  - colchicine 0.6 mg tablet; Take 1 Tablet by mouth daily. Dispense: 90 Tablet; Refill: 1    5. Long-term use of immunosuppressant medication  - methotrexate 25 mg/mL chemo injection; 0.8 mL by SubCUTAneous route every Wednesday. Dispense: 12 mL; Refill: 0    TODAY'S ORDERS    Patient Instructions   1. Reduce methotrexate to 20mg (0.8mL) once a week, and continue folic acid daily. Reduce colchicine to 1 pill daily. 2. If you're having consistent metallic taste around the time of your methotrexate, try taking Pepcid 20mg twice a day the day before through the day after your methotrexate to see if this makes a difference. 3. Repeat labs in 1 month. 4. Return in 4 months.         Orders Placed This Encounter    HEPATITIS B EVALUATION    HEPATITIS C AB    C REACTIVE PROTEIN, QT    CBC WITH AUTOMATED DIFF    METABOLIC PANEL, COMPREHENSIVE    SED RATE (ESR)    colchicine 0.6 mg tablet    methotrexate 25 mg/mL chemo injection     Future Appointments   Date Time Provider Jorge Garrett   6/14/2022  8:30 AM Doug Fernandez MD AOCR BS AMB     Face to face time: 25 minutes  Note preparation and records review day of service: 20 minutes  Total provider time day of service: 39 minutes      Carin Edwards MD    Adult Rheumatology   [facility]  A Part of [facility], [address] [facility], [address]   Phone [phone]  Fax [phone]

## 2022-02-14 NOTE — LETTER
PATIENT:   Cleo Crespo   YOB: 1985  DATE OF VISIT: 2/14/2022          Dear Reji Guallpa    We recently saw Mr. Cleo Crespo in the [facility] for evaluation. My notes for this consultation are attached. If you have questions, please do not hesitate to call me. I look forward to following your patient along with you.       Sincerely,    Norwood Gottron, MD [facility]  Cell: [phone]     Norwood Gottron, MD    Cc:  No Recipients

## 2022-02-14 NOTE — PROGRESS NOTES
Chief Complaint   Patient presents with    Arthritis   1. Have you been to the ER, urgent care clinic since your last visit? Hospitalized since your last visit? No    2. Have you seen or consulted any other health care providers outside of the [address] [place] [address] since your last visit? Include any pap smears or colon screening.  Yes Where: PCP, Dr. Dasia Belle

## 2022-03-18 PROBLEM — S53.115A: Status: ACTIVE | Noted: 2021-05-12

## 2022-03-18 PROBLEM — Z79.60 LONG-TERM USE OF IMMUNOSUPPRESSANT MEDICATION: Status: ACTIVE | Noted: 2019-09-20

## 2022-03-19 PROBLEM — M45.A0 NON-RADIOGRAPHIC AXIAL SPONDYLOARTHRITIS (HCC): Status: ACTIVE | Noted: 2019-09-20

## 2022-03-21 LAB
ALBUMIN SERPL-MCNC: 4.8 G/DL (ref 4–5)
ALBUMIN/GLOB SERPL: 1.8 {RATIO} (ref 1.2–2.2)
ALP SERPL-CCNC: 84 IU/L (ref 44–121)
ALT SERPL-CCNC: 22 IU/L (ref 0–44)
AST SERPL-CCNC: 26 IU/L (ref 0–40)
BASOPHILS # BLD AUTO: 0 X10E3/UL (ref 0–0.2)
BASOPHILS NFR BLD AUTO: 0 %
BILIRUB SERPL-MCNC: 0.6 MG/DL (ref 0–1.2)
BUN SERPL-MCNC: 12 MG/DL (ref 6–20)
BUN/CREAT SERPL: 14 (ref 9–20)
CALCIUM SERPL-MCNC: 10.1 MG/DL (ref 8.7–10.2)
CHLORIDE SERPL-SCNC: 104 MMOL/L (ref 96–106)
CO2 SERPL-SCNC: 24 MMOL/L (ref 20–29)
CREAT SERPL-MCNC: 0.88 MG/DL (ref 0.76–1.27)
CRP SERPL-MCNC: <1 MG/L (ref 0–10)
EGFR: 114 ML/MIN/1.73
EOSINOPHIL # BLD AUTO: 0.1 X10E3/UL (ref 0–0.4)
EOSINOPHIL NFR BLD AUTO: 1 %
ERYTHROCYTE [DISTWIDTH] IN BLOOD BY AUTOMATED COUNT: 13.6 % (ref 11.6–15.4)
ERYTHROCYTE [SEDIMENTATION RATE] IN BLOOD BY WESTERGREN METHOD: 4 MM/HR (ref 0–15)
GLOBULIN SER CALC-MCNC: 2.6 G/DL (ref 1.5–4.5)
GLUCOSE SERPL-MCNC: 91 MG/DL (ref 65–99)
HBV CORE AB SERPL QL IA: NEGATIVE
HBV CORE IGM SERPL QL IA: NEGATIVE
HBV E AB SERPL QL IA: NEGATIVE
HBV E AG SERPL QL IA: NEGATIVE
HBV SURFACE AB SER QL: NON REACTIVE
HBV SURFACE AG SERPL QL IA: NEGATIVE
HCT VFR BLD AUTO: 44.4 % (ref 37.5–51)
HCV AB S/CO SERPL IA: <0.1 S/CO RATIO (ref 0–0.9)
HGB BLD-MCNC: 14.9 G/DL (ref 13–17.7)
IMM GRANULOCYTES # BLD AUTO: 0 X10E3/UL (ref 0–0.1)
IMM GRANULOCYTES NFR BLD AUTO: 0 %
LYMPHOCYTES # BLD AUTO: 1.5 X10E3/UL (ref 0.7–3.1)
LYMPHOCYTES NFR BLD AUTO: 35 %
MCH RBC QN AUTO: 30.4 PG (ref 26.6–33)
MCHC RBC AUTO-ENTMCNC: 33.6 G/DL (ref 31.5–35.7)
MCV RBC AUTO: 91 FL (ref 79–97)
MONOCYTES # BLD AUTO: 0.4 X10E3/UL (ref 0.1–0.9)
MONOCYTES NFR BLD AUTO: 9 %
NEUTROPHILS # BLD AUTO: 2.2 X10E3/UL (ref 1.4–7)
NEUTROPHILS NFR BLD AUTO: 55 %
PLATELET # BLD AUTO: 248 X10E3/UL (ref 150–450)
POTASSIUM SERPL-SCNC: 4.5 MMOL/L (ref 3.5–5.2)
PROT SERPL-MCNC: 7.4 G/DL (ref 6–8.5)
RBC # BLD AUTO: 4.9 X10E6/UL (ref 4.14–5.8)
SODIUM SERPL-SCNC: 144 MMOL/L (ref 134–144)
WBC # BLD AUTO: 4.1 X10E3/UL (ref 3.4–10.8)

## 2022-05-11 DIAGNOSIS — Z79.60 LONG-TERM USE OF IMMUNOSUPPRESSANT MEDICATION: ICD-10-CM

## 2022-05-11 DIAGNOSIS — L40.50 PSORIATIC ARTHRITIS (HCC): ICD-10-CM

## 2022-05-12 RX ORDER — METHOTREXATE 25 MG/ML
20 INJECTION, SOLUTION INTRA-ARTERIAL; INTRAMUSCULAR; INTRAVENOUS
Qty: 12 ML | Refills: 0 | Status: SHIPPED | OUTPATIENT
Start: 2022-05-18 | End: 2022-06-14 | Stop reason: SDUPTHER

## 2022-05-12 RX ORDER — SYRINGE-NEEDLE,INSULIN,0.5 ML 30 GX5/16"
1 SYRINGE, EMPTY DISPOSABLE MISCELLANEOUS
Qty: 100 EACH | Refills: 0 | Status: SHIPPED | OUTPATIENT
Start: 2022-05-18 | End: 2022-08-04

## 2022-06-14 ENCOUNTER — OFFICE VISIT (OUTPATIENT)
Dept: RHEUMATOLOGY | Age: 37
End: 2022-06-14
Payer: COMMERCIAL

## 2022-06-14 VITALS
SYSTOLIC BLOOD PRESSURE: 118 MMHG | OXYGEN SATURATION: 97 % | TEMPERATURE: 97.8 F | BODY MASS INDEX: 28.22 KG/M2 | HEIGHT: 66 IN | RESPIRATION RATE: 16 BRPM | HEART RATE: 74 BPM | WEIGHT: 175.6 LBS | DIASTOLIC BLOOD PRESSURE: 69 MMHG

## 2022-06-14 DIAGNOSIS — M54.89 INFLAMMATORY BACK PAIN: ICD-10-CM

## 2022-06-14 DIAGNOSIS — L40.50 PSORIATIC ARTHRITIS (HCC): Primary | ICD-10-CM

## 2022-06-14 DIAGNOSIS — Z79.60 LONG-TERM USE OF IMMUNOSUPPRESSANT MEDICATION: ICD-10-CM

## 2022-06-14 PROCEDURE — 99214 OFFICE O/P EST MOD 30 MIN: CPT | Performed by: INTERNAL MEDICINE

## 2022-06-14 RX ORDER — METHOTREXATE 25 MG/ML
25 INJECTION, SOLUTION INTRA-ARTERIAL; INTRAMUSCULAR; INTRAVENOUS
Qty: 12 ML | Refills: 0
Start: 2022-06-15 | End: 2022-08-22 | Stop reason: SDUPTHER

## 2022-06-14 NOTE — LETTER
6/14/2022    Patient: Ford Fregoso   YOB: 1985   Date of Visit: 6/14/2022     Karina Hannah MD  [address] [address]  Via Fax: [phone]    Dear Karina Hannah MD,    We recently saw Mr. Ford Fregoso in the [facility] for evaluation. My notes for this consultation are attached. If you have questions, please do not hesitate to call me. I look forward to following your patient along with you.       Sincerely,    Vale Cristina MD [facility]  Cell: [phone]

## 2022-06-14 NOTE — PROGRESS NOTES
Chief Complaint   Patient presents with    Arthritis    Back Pain     1. Have you been to the ER, urgent care clinic since your last visit? Hospitalized since your last visit? No    2. Have you seen or consulted any other health care providers outside of the [address] [place] [address] since your last visit? Include any pap smears or colon screening.  No

## 2022-06-14 NOTE — PROGRESS NOTES
REASON FOR VISIT    This is an in-office follow-up visit for Mr. Analia Moore for     ICD-10-CM   1. 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oarthritis ([facility] M46.90       Inflammatory arthritis phenotype includes:  Anti-CCP positive: no  Rheumatoid factor positive: no  HLA-B27 positive: yes  Inflammatory Back Pain: yes  Erosive disease: no  Sacroiliitis: no  Ankylosis: no  Psoriasis: no  Enthesitis: no  Dactylitis: no  Nail Pitting: no  Onycholysis: no  Splinter Hemorrhage: no  Extra-articular manifestations include: sternal pain (costochondritis), oral painful/painless ulcers (since childhood)  SAPHO: no    Immunosuppression Screening (6/26/2019): Quantiferon TB: negative  PPD:  Not performed  Hepatitis B: negative  Hepatitis C: negative    Therapy History includes:  Current DMARD therapy include: methotrexate 25 mg SubQ every Wednesday (6/10/2020 to present)  Prior DMARD therapy include: methotrexate 20 mg every Saturday (7/01/2019 to 6/10/2020)  Discontinued DMARDs because of inefficacy: None  Discontinued DMARDs because of side effects: None    HISTORY OF PRESENT ILLNESS    Mr. Analia Moore returns for a follow-up. Pt reports that his scratchy, sore throats started to come back after decreasing his methotrexate to 0.8 mL. He also decreased his colchicine from 2 to 1 pills per day. He reports that he had some ulcers since his last visit, but they were not always present. His mouth ulcers are a lot less than they were in the past before receiving treatment. Currently his sore throat is improved from its worse, but it is still bothering him. He also notes that his taste changes sometimes with a \"soapy\" taste that waxes and wanes. He says that he sometimes wakes up with soreness in his left lower back, though when he starts to move around in the morning the soreness goes away after 20 minutes and does not come back. He has minimal pain elsewhere in his joints. He rides his bike 15-20 miles a day and is able to lift weights.      Pt also notes of mild psoriasis today with a new patch of bumps on his right elbow. Nonpruritic. Pt denies recent chest pain. Still no lifetime h/o genital ulcers. REVIEW OF SYSTEMS    A comprehensive review of systems was performed and pertinent results are documented in the HPI, review of systems is otherwise non-contributory. PAST MEDICAL HISTORY    He has a past medical history of Autoimmune disease ([address] FAMILY HISTORY    His family history includes Dementia in his maternal grandmother; Heart Disease in his maternal grandfather and paternal grandfather; No Known Problems in his father, mother, and sister. SOCIAL HISTORY    He reports that he has never smoked. He has never used smokeless tobacco. He reports current alcohol use. He reports that he does not use drugs. MEDICATIONS    Current Outpatient Medications   Medication Sig    methotrexate 25 mg/mL chemo injection 0.8 mL by SubCUTAneous route every Wednesday.  Insulin Syringe-Needle U-100 1 mL 30 gauge x 5/16 syrg 1 Each by Does Not Apply route every Wednesday for 12 doses. To be used for methotrexate solution    colchicine 0.6 mg tablet Take 1 Tablet by mouth daily.  folic acid (FOLVITE) 1 mg tablet TAKE 1 TABLET BY MOUTH EVERY DAY    omega 3-dha-epa-fish oil (FISH OIL) 100-160-1,000 mg cap Take  by mouth daily. (Patient not taking: Reported on 2/14/2022)    multivitamin (ONE A DAY) tablet Take 1 Tab by mouth daily.  calcium carbonate (OS-BARBARA) 500 mg calcium (1,250 mg) tablet Take  by mouth daily. No current facility-administered medications for this visit. ALLERGIES    No Known Allergies    PHYSICAL EXAMINATION    Visit Vitals  /69 (BP 1 Location: Left upper arm, BP Patient Position: Sitting)   Pulse 74   Temp 97.8 °F (36.6 °C) (Oral)   Resp 16   Ht 5' 6\" (1.676 m)   Wt 175 lb 9.6 oz (79.7 kg)   SpO2 97%   BMI 28.34 kg/m²     Body mass index is 28.34 kg/m².     General:  The patient is well developed, well nourished, alert, and in no apparent distress. Eyes: Sclera are anicteric. No conjunctival injection. HEENT:  Scant posterior oropharyngeal erythema without ulcers or exudates. Adequate salivary pooling. No cervical or supraclavicular lymphadenopathy. Lungs:  Clear to auscultation bilaterally, without wheeze or stridor. Normal respiratory effort. Cor:  Regular rate and rhythm. No murmur rub or gallop. Abdomen: Soft, non-tender, without hepatomegaly or masses. Extremities: No calf tenderness or edema. Warm and well perfused. Skin:  New patch of scattered flesh colored non pruritic papules on R elbow. Neuro: Nonfocal  Musculoskeletal:    A comprehensive musculoskeletal exam was performed for all joints of each upper and lower extremity and assessed for swelling, tenderness and range of motion. Results are documented as below:  Left paralumbar tenderness, no direct SI tenderness. Negative CAROL bilaterally  mSchober not reexamined today  No evidence of synovitis in the small joints of the hands, wrists, shoulders, elbows, hips, knees or ankles. DATA REVIEW    Laboratory     Recent laboratory results were reviewed, summarized, and discussed with the patient. 3/18/22: ESR 4, Cr 0.88, WBC 4.1, HGB 14.9, Plt 248, CRP <1 mg/L, Hep C Virus Ab <0.1, Hepatitis B evaluation normal  11/24/21: CBC WNL; Cr 0.80, LFTs WNL      Imaging    Musculoskeletal Ultrasound    None    Radiographs  XR ELBOW LT AP/LAT 5/12/21: Satisfactory reduction. 5/12/21 XR WRIST LT AP/LAT: No fracture or dislocation. XR ELBOW LT MIN 3 V 5/12/21: Elbow dislocation. Bilateral Hand 6/26/2019: LEFT: There is no acute fracture or bony erosion. The joint spacesare maintained. The soft tissues are unremarkable. RIGHT: There is no acute fracture or bony erosion. The joint spaces are maintained. The soft tissues are unremarkable. Sacroiliac Joint 6/26/2019: There is no acute fracture or bony erosion.  The sacroiliac and hip joint spaces are maintained. There is no sclerosis adjacent to the sacroiliac joints.     Bilateral Foot 6/26/2019: LEFT: There is no acute fracture or bony erosion. The joint spaces are maintained. The soft tissues are unremarkable. RIGHT: There is no acute fracture or bony erosion. The joint spaces are maintained. The soft tissues are unremarkable. CT Imaging    None    MR Imaging    None    DXA     None    ASSESSMENT AND PLAN    This is a follow-up visit for Mr. Gabriel Cox for likely psoriatic arthritis with peripheral arthralgias, oral aphthosis, and costochondritis, with worsened pharyngitis and rash since reducing methotrexate and colchicine. Will start with increase in methotrexate back to 20mg weekly. Continuing once daily colchicine for now. Reviewed Cathyann Bamberger as an alternative, but Mr. Gabriel Cox prefers to continue adjusting his current medications for now. 1. Inflammatory back pain  - XR SI JTS MIN 3 V; Future  - XR SPINE LUMB 2 OR 3 V; Future    2. Psoriatic arthritis (HCC)  - XR SI JTS MIN 3 V; Future  - XR SPINE LUMB 2 OR 3 V; Future  - C REACTIVE PROTEIN, QT; Future  - CBC WITH AUTOMATED DIFF; Future  - METABOLIC PANEL, COMPREHENSIVE; Future  - SED RATE (ESR); Future  - QUANTIFERON-TB GOLD PLUS; Future  - methotrexate 25 mg/mL chemo injection; 1 mL by SubCUTAneous route every Wednesday. Dispense: 12 mL; Refill: 0  -Cont colchicine for oral aphthosis    3. Long-term use of immunosuppressant medication  - CBC WITH AUTOMATED DIFF; Future  - METABOLIC PANEL, COMPREHENSIVE; Future  - QUANTIFERON-TB GOLD PLUS; Future  - methotrexate 25 mg/mL chemo injection; 1 mL by SubCUTAneous route every Wednesday. Dispense: 12 mL; Refill: 0       Patient Instructions   1) Increase your methotrexate back up to 1 mL per week. Continue taking a folic acid pill daily. 2) Continue taking 1 pill of colchicine per day. 3) I am going to order an Xray for your SI joints and lower back to make sure there are no changes.  You can get this done at any [address] [place] [address]. 4) Check labs one month after increasing methotrexate. 5) Follow up in 3 months. Let me know if you have any questions in the meantime. TODAY'S ORDERS    Patient Instructions   1) Increase your methotrexate back up to 1 mL per week. Continue taking a folic acid pill daily. 2) Continue taking 1 pill of colchicine per day. 3) I am going to order an Xray for your SI joints and lower back to make sure there are no changes. You can get this done at any [address] [place] [address]. 4) Check labs one month after increasing methotrexate. 5) Follow up in 3 months. Let me know if you have any questions in the meantime.              Orders Placed This Encounter    QUANTIFERON-TB GOLD PLUS    XR SI JTS MIN 3 V    XR SPINE LUMB 2 OR 3 V    C REACTIVE PROTEIN, QT    CBC WITH AUTOMATED DIFF    METABOLIC PANEL, COMPREHENSIVE    SED RATE (ESR)    methotrexate 25 mg/mL chemo injection     Future Appointments   Date Time Provider [facility]   8/22/2022  8:00 AM Reuben Deal MD [facility] BS AMB     Face to face time: 22 minutes  Note preparation and records review day of service: 15 minutes  Total provider time day of service: 37 minutes    This was scribed by Sherif Marinelli in the presence of Dr. Jessie Silva MD    Adult Rheumatology   [id number] [facility] [address]  [facility], [facility], [address] [place] [address]   Phone [phone]  Fax [phone]

## 2022-06-14 NOTE — PATIENT INSTRUCTIONS
1) Increase your methotrexate back up to 1 mL per week. Continue taking a folic acid pill daily. 2) Continue taking 1 pill of colchicine per day. 3) I am going to order an Xray for your SI joints and lower back to make sure there are no changes. You can get this done at any [address] [place] [address]. 4) Check labs one month after increasing methotrexate. 5) Follow up in 3 months. Let me know if you have any questions in the meantime.

## 2022-07-08 DIAGNOSIS — M45.A0 NON-RADIOGRAPHIC AXIAL SPONDYLOARTHRITIS (HCC): ICD-10-CM

## 2022-07-08 DIAGNOSIS — Z79.60 LONG-TERM USE OF IMMUNOSUPPRESSANT MEDICATION: ICD-10-CM

## 2022-07-11 RX ORDER — FOLIC ACID 1 MG/1
TABLET ORAL
Qty: 90 TABLET | Refills: 5 | Status: SHIPPED | OUTPATIENT
Start: 2022-07-11 | End: 2022-08-22 | Stop reason: SDUPTHER

## 2022-07-22 ENCOUNTER — TRANSCRIBE ORDER (OUTPATIENT)
Dept: GENERAL RADIOLOGY | Age: 37
End: 2022-07-22

## 2022-07-22 ENCOUNTER — TELEPHONE (OUTPATIENT)
Dept: RHEUMATOLOGY | Age: 37
End: 2022-07-22

## 2022-07-22 ENCOUNTER — HOSPITAL ENCOUNTER (OUTPATIENT)
Dept: GENERAL RADIOLOGY | Age: 37
Discharge: HOME | End: 2022-07-22
Attending: INTERNAL MEDICINE
Payer: COMMERCIAL

## 2022-07-22 DIAGNOSIS — M54.89 INFLAMMATORY BACK PAIN: ICD-10-CM

## 2022-07-22 DIAGNOSIS — L40.50 PSORIATIC ARTHROPATHY (HCC): ICD-10-CM

## 2022-07-22 DIAGNOSIS — L40.50 PSORIATIC ARTHRITIS (HCC): ICD-10-CM

## 2022-07-22 DIAGNOSIS — M53.3 SACROILIAC PAIN: ICD-10-CM

## 2022-07-22 DIAGNOSIS — M53.3 SACROILIAC PAIN: Primary | ICD-10-CM

## 2022-07-22 DIAGNOSIS — M54.89 VERTEBROGENIC PAIN SYNDROME: Primary | ICD-10-CM

## 2022-07-22 LAB
ALBUMIN SERPL-MCNC: 5.1 G/DL (ref 4–5)
ALBUMIN/GLOB SERPL: 2.1 {RATIO} (ref 1.2–2.2)
ALP SERPL-CCNC: 84 IU/L (ref 44–121)
ALT SERPL-CCNC: 26 IU/L (ref 0–44)
AST SERPL-CCNC: 37 IU/L (ref 0–40)
BASOPHILS # BLD AUTO: 0 X10E3/UL (ref 0–0.2)
BASOPHILS NFR BLD AUTO: 0 %
BILIRUB SERPL-MCNC: 0.5 MG/DL (ref 0–1.2)
BUN SERPL-MCNC: 13 MG/DL (ref 6–20)
BUN/CREAT SERPL: 14 (ref 9–20)
CALCIUM SERPL-MCNC: 9.9 MG/DL (ref 8.7–10.2)
CHLORIDE SERPL-SCNC: 100 MMOL/L (ref 96–106)
CO2 SERPL-SCNC: 26 MMOL/L (ref 20–29)
CREAT SERPL-MCNC: 0.92 MG/DL (ref 0.76–1.27)
CRP SERPL-MCNC: <1 MG/L (ref 0–10)
EGFR: 111 ML/MIN/1.73
EOSINOPHIL # BLD AUTO: 0 X10E3/UL (ref 0–0.4)
EOSINOPHIL NFR BLD AUTO: 1 %
ERYTHROCYTE [DISTWIDTH] IN BLOOD BY AUTOMATED COUNT: 13.7 % (ref 11.6–15.4)
ERYTHROCYTE [SEDIMENTATION RATE] IN BLOOD BY WESTERGREN METHOD: 12 MM/HR (ref 0–15)
GAMMA INTERFERON BACKGROUND BLD IA-ACNC: 0 IU/ML
GLOBULIN SER CALC-MCNC: 2.4 G/DL (ref 1.5–4.5)
GLUCOSE SERPL-MCNC: 89 MG/DL (ref 65–99)
HCT VFR BLD AUTO: 42.3 % (ref 37.5–51)
HGB BLD-MCNC: 14.3 G/DL (ref 13–17.7)
IMM GRANULOCYTES # BLD AUTO: 0 X10E3/UL (ref 0–0.1)
IMM GRANULOCYTES NFR BLD AUTO: 0 %
LYMPHOCYTES # BLD AUTO: 1.6 X10E3/UL (ref 0.7–3.1)
LYMPHOCYTES NFR BLD AUTO: 35 %
M TB IFN-G BLD-IMP: NEGATIVE
M TB IFN-G CD4+ BCKGRND COR BLD-ACNC: 0 IU/ML
MCH RBC QN AUTO: 30.3 PG (ref 26.6–33)
MCHC RBC AUTO-ENTMCNC: 33.8 G/DL (ref 31.5–35.7)
MCV RBC AUTO: 90 FL (ref 79–97)
MITOGEN IGNF BLD-ACNC: >10 IU/ML
MONOCYTES # BLD AUTO: 0.4 X10E3/UL (ref 0.1–0.9)
MONOCYTES NFR BLD AUTO: 8 %
NEUTROPHILS # BLD AUTO: 2.6 X10E3/UL (ref 1.4–7)
NEUTROPHILS NFR BLD AUTO: 56 %
PLATELET # BLD AUTO: 250 X10E3/UL (ref 150–450)
POTASSIUM SERPL-SCNC: 4.4 MMOL/L (ref 3.5–5.2)
PROT SERPL-MCNC: 7.5 G/DL (ref 6–8.5)
QUANTIFERON INCUBATION, QF1T: NORMAL
QUANTIFERON TB2 AG: 0 IU/ML
RBC # BLD AUTO: 4.72 X10E6/UL (ref 4.14–5.8)
SERVICE CMNT-IMP: NORMAL
SODIUM SERPL-SCNC: 140 MMOL/L (ref 134–144)
WBC # BLD AUTO: 4.7 X10E3/UL (ref 3.4–10.8)

## 2022-07-22 PROCEDURE — 72202 X-RAY EXAM SI JOINTS 3/> VWS: CPT

## 2022-07-22 PROCEDURE — 72100 X-RAY EXAM L-S SPINE 2/3 VWS: CPT

## 2022-07-22 NOTE — TELEPHONE ENCOUNTER
Alean Goodpasture from [facility] called to advise the patient Xray for SI Joints was  and would need a new order. Spoke with doc and is sending new order.

## 2022-08-22 ENCOUNTER — OFFICE VISIT (OUTPATIENT)
Dept: RHEUMATOLOGY | Age: 37
End: 2022-08-22
Payer: COMMERCIAL

## 2022-08-22 VITALS
TEMPERATURE: 97.7 F | BODY MASS INDEX: 28.12 KG/M2 | WEIGHT: 175 LBS | DIASTOLIC BLOOD PRESSURE: 81 MMHG | HEART RATE: 79 BPM | SYSTOLIC BLOOD PRESSURE: 122 MMHG | HEIGHT: 66 IN | RESPIRATION RATE: 18 BRPM

## 2022-08-22 DIAGNOSIS — M45.A0 NON-RADIOGRAPHIC AXIAL SPONDYLOARTHRITIS (HCC): ICD-10-CM

## 2022-08-22 DIAGNOSIS — Z79.60 LONG-TERM USE OF IMMUNOSUPPRESSANT MEDICATION: ICD-10-CM

## 2022-08-22 DIAGNOSIS — L40.50 PSORIATIC ARTHRITIS (HCC): ICD-10-CM

## 2022-08-22 PROCEDURE — 99214 OFFICE O/P EST MOD 30 MIN: CPT | Performed by: INTERNAL MEDICINE

## 2022-08-22 RX ORDER — METHOTREXATE 25 MG/ML
25 INJECTION, SOLUTION INTRA-ARTERIAL; INTRAMUSCULAR; INTRAVENOUS
Qty: 12 ML | Refills: 0 | Status: SHIPPED | OUTPATIENT
Start: 2022-08-24

## 2022-08-22 RX ORDER — FOLIC ACID 1 MG/1
2 TABLET ORAL DAILY
Qty: 180 TABLET | Refills: 3 | Status: SHIPPED | OUTPATIENT
Start: 2022-08-22

## 2022-08-22 NOTE — PROGRESS NOTES
REASON FOR VISIT    This is an in-office follow-up visit for Mr. Kt Harrington for     ICD-10-CM   1. 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oarthritis ([facility] M46.90       Inflammatory arthritis phenotype includes:  Anti-CCP positive: no  Rheumatoid factor positive: no  HLA-B27 positive: yes  Inflammatory Back Pain: yes  Erosive disease: no  Sacroiliitis: no  Ankylosis: no  Psoriasis: no  Enthesitis: no  Dactylitis: no  Nail Pitting: no  Onycholysis: no  Splinter Hemorrhage: no  Extra-articular manifestations include: sternal pain (costochondritis), oral painful/painless ulcers (since childhood)  SAPHO: no    Immunosuppression Screening (6/26/2019): Quantiferon TB: negative  PPD:  Not performed  Hepatitis B: negative  Hepatitis C: negative    Therapy History includes:  Current DMARD therapy include: methotrexate 25 mg SubQ every Wednesday (6/10/2020 to present)  Prior DMARD therapy include: methotrexate 20 mg every Saturday (7/01/2019 to 6/10/2020)  Discontinued DMARDs because of inefficacy: None  Discontinued DMARDs because of side effects: None    HISTORY OF PRESENT ILLNESS    Mr. Kt Harrington returns for a follow-up. Pt notes that he feels a little bit better after increasing his Methotrexate . He still takes 1 pill of folic acid daily. Pt takes one pill of Colchicine per day which he tolerates well. He first started taking this medication when he had bad mouth ulcers. Pt reports that his lower back is still giving him some discomfort. Pt reports that he has bad allergies. He says that he has been using Sinex which is a twice a day spray and his taste is \"mucousy\" in the back of his mouth though he feels is better rather than worse on days he takes this. He says that his taste changes first started a little bit over a year ago around the time he started to inject Methotrexate (recalls he had this taste change at a wedding last August). Pt notes that his skin is about the same.  He denies itching but he says his skin is particularly sensitive. He says that he does not notice a difference after eating gluten. He started to use cerave for his skin    Pt denies fever, chills    Pt lifts 5x a week and bikes for an hour plus daily. REVIEW OF SYSTEMS    A comprehensive review of systems was performed and pertinent results are documented in the HPI, review of systems is otherwise non-contributory. PAST MEDICAL HISTORY    He has a past medical history of Autoimmune disease ([address] FAMILY HISTORY    His family history includes Dementia in his maternal grandmother; Heart Disease in his maternal grandfather and paternal grandfather; No Known Problems in his father, mother, and sister. SOCIAL HISTORY    He reports that he has never smoked. He has never used smokeless tobacco. He reports current alcohol use. He reports that he does not use drugs. MEDICATIONS    Current Outpatient Medications   Medication Sig    folic acid (FOLVITE) 1 mg tablet TAKE 1 TABLET BY MOUTH EVERY DAY    methotrexate 25 mg/mL chemo injection 1 mL by SubCUTAneous route every Wednesday. colchicine 0.6 mg tablet Take 1 Tablet by mouth daily. omega 3-dha-epa-fish oil (FISH OIL) 100-160-1,000 mg cap Take  by mouth daily. (Patient not taking: Reported on 2/14/2022)    multivitamin (ONE A DAY) tablet Take 1 Tab by mouth daily. calcium carbonate (OS-BARBARA) 500 mg calcium (1,250 mg) tablet Take  by mouth daily. No current facility-administered medications for this visit. ALLERGIES    No Known Allergies    PHYSICAL EXAMINATION    Visit Vitals  /81   Pulse 79   Temp 97.7 °F (36.5 °C)   Resp 18   Ht 5' 6\" (1.676 m)   Wt 175 lb (79.4 kg)   BMI 28.25 kg/m²       Body mass index is 28.25 kg/m². General:  The patient is well developed, well nourished, alert, and in no apparent distress. Eyes: Sclera are anicteric. No conjunctival injection. HEENT:  Oropharynx is clear. 1 MM apthous ulcer behind L posterior maxillary molar. . Adequate salivary pooling. No cervical or supraclavicular lymphadenopathy. Lungs:  Clear to auscultation bilaterally, without wheeze or stridor. Normal respiratory effort. Cor:  Regular rate and rhythm. No murmur rub or gallop. Abdomen: Soft, non-tender, without hepatomegaly or masses. Extremities: No calf tenderness or edema. Warm and well perfused. Skin:  Slightly improved scattered flesh colored non-pruritic papules on R elbow. .   Neuro: Nonfocal, negative straight leg raise bilaterally. Musculoskeletal:    A comprehensive musculoskeletal exam was performed for all joints of each upper and lower extremity and assessed for swelling, tenderness and range of motion. Results are documented as below:  Negative CAROL. Right paralumbar tenderness c/w facet arthropathy. No evidence of synovitis in the small joints of the hands, wrists, shoulders, elbows, hips, knees or ankles. DATA REVIEW    Laboratory     Recent laboratory results were reviewed, summarized, and discussed with the patient. 7/15/22: QuantiFERON plus neg, ESR 12, Cr 0.92, LFT WNL, CBC WNL, CRP <1 mg/L  3/18/22: ESR 4, Cr 0.88, WBC 4.1, HGB 14.9, Plt 248, CRP <1 mg/L, Hep C Virus Ab <0.1, Hepatitis B evaluation normal  11/24/21: CBC WNL; Cr 0.80, LFTs WNL      Imaging    Musculoskeletal Ultrasound    None    Radiographs    XR SPINE LUMB 2 OR 3 V (7/22/22): 1. No fracture. 2. Dextroscoliosis and L5-S1 grade 2 anterolisthesis. If comparison imaging becomes available, I will addend this report. XR SI JTS MIN 3 V (7/22/22): Normal sacroiliac joints. XR ELBOW LT AP/LAT 5/12/21: Satisfactory reduction. 5/12/21 XR WRIST LT AP/LAT: No fracture or dislocation. XR ELBOW LT MIN 3 V 5/12/21: Elbow dislocation. Bilateral Hand 6/26/2019: LEFT: There is no acute fracture or bony erosion. The joint spacesare maintained. The soft tissues are unremarkable. RIGHT: There is no acute fracture or bony erosion. The joint spaces are maintained.  The soft tissues are unremarkable. Sacroiliac Joint 6/26/2019: There is no acute fracture or bony erosion. The sacroiliac and hip joint spaces are maintained. There is no sclerosis adjacent to the sacroiliac joints. Bilateral Foot 6/26/2019: LEFT: There is no acute fracture or bony erosion. The joint spaces are maintained. The soft tissues are unremarkable. RIGHT: There is no acute fracture or bony erosion. The joint spaces are maintained. The soft tissues are unremarkable. CT Imaging    None    MR Imaging    None    DXA     None    ASSESSMENT AND PLAN    This is a follow-up visit for Mr. Anna Romero for likely psoriatic arthritis with peripheral arthralgias, oral aphthosis, and costochondritis improved with increased methotrexate. Discussed transition to an alternative for dysgeusia on high-dose methotrexate, but he doesn't find this bothersome enough to risk changes to DMARDs. Continuing current regimen, will trial increased folic acid to 2mg daily. 1. Psoriatic arthritis (HCC)  - methotrexate 25 mg/mL chemo injection; 1 mL by SubCUTAneous route every Wednesday. Dispense: 12 mL; Refill: 0  - C REACTIVE PROTEIN, QT; Future  - CBC WITH AUTOMATED DIFF; Future  - METABOLIC PANEL, COMPREHENSIVE; Future  - SED RATE (ESR); Future    2. Long-term use of immunosuppressant medication  - methotrexate 25 mg/mL chemo injection; 1 mL by SubCUTAneous route every Wednesday. Dispense: 12 mL; Refill: 0  - folic acid (FOLVITE) 1 mg tablet; Take 2 Tablets by mouth daily. Dispense: 180 Tablet; Refill: 3  - C REACTIVE PROTEIN, QT; Future  - CBC WITH AUTOMATED DIFF; Future  - METABOLIC PANEL, COMPREHENSIVE; Future  - SED RATE (ESR); Future    3. 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oarthritis (HCC)  - folic acid (FOLVITE) 1 mg tablet; Take 2 Tablets by mouth daily. Dispense: 180 Tablet; Refill: 3        Patient Instructions   1) Continue to take 1 mL of Subcutaneous Methotrexate once per week. Increase your folic acid to 2 pills daily. 2) Continue to take 1 pill of Colchicine daily. 3) Check labs in October. 4) Follow up in 4 months. Let me know if you have any new or worsening symptoms or questions in the meantime.      TODAY'S ORDERS    Orders Placed This Encounter    C REACTIVE PROTEIN, QT    CBC WITH AUTOMATED DIFF    METABOLIC PANEL, COMPREHENSIVE    SED RATE (ESR)    methotrexate 25 mg/mL chemo injection    folic acid (FOLVITE) 1 mg tablet       Future Appointments   Date Time Provider oJrge Deleoni   12/22/2022  9:00 AM Jesusita Armendariz MD [facility] BS AMB     Face to face time: 8 minutes  Note preparation and records review day of service: 23 minutes  Total provider time day of service: 31 minutes    This was scribed by Sydnee Marie in the presence of Dr. Chintan Stafford MD    Adult Rheumatology   [address] [facility] [address]  [facility], [address] [place] [address]   Phone [phone]  Fax [phone]

## 2022-08-22 NOTE — PATIENT INSTRUCTIONS
1) Continue to take 1 mL of Subcutaneous Methotrexate once per week. Increase your folic acid to 2 pills daily. 2) Continue to take 1 pill of Colchicine daily. 3) Check labs in October. 4) Follow up in 4 months. Let me know if you have any new or worsening symptoms or questions in the meantime.

## 2022-08-22 NOTE — LETTER
8/22/2022    Patient: Kyle Morales   YOB: 1985   Date of Visit: 8/22/2022     Madiosn Ann MD  [address] [address]  Via Fax: [phone]    Dear Madison Ann MD,    We recently saw Mr. Kyle Morales in the [facility] for evaluation. My notes for this consultation are attached. If you have questions, please do not hesitate to call me. I look forward to following your patient along with you.       Sincerely,  Attila Duron MD S  Cell: [phone]

## 2022-12-21 NOTE — PROGRESS NOTES
REASON FOR VISIT    This is an in-office follow-up visit for Mr. Echevarria for     ICD-10-CM   1. 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[facility] ([facility] M46.90       Inflammatory arthritis phenotype includes:  Anti-CCP positive: no  Rheumatoid factor positive: no  HLA-B27 positive: yes  Inflammatory Back Pain: yes  Erosive disease: no  Sacroiliitis: no  Ankylosis: no  Psoriasis: no  Enthesitis: no  Dactylitis: no  Nail Pitting: no  Onycholysis: no  Splinter Hemorrhage: no  Extra-articular manifestations include: sternal pain (costochondritis), oral painful/painless ulcers (since childhood), chronic sinus congestion  SAPHO: no    Immunosuppression Screening (6/26/2019): Quantiferon TB: negative  PPD:  Not performed  Hepatitis B: negative  Hepatitis C: negative    Therapy History includes:  Current DMARD therapy include: methotrexate 25 mg SubQ every Wednesday (6/10/2020 to present)  Prior DMARD therapy include: methotrexate 20 mg every Saturday (7/01/2019 to 6/10/2020)  Discontinued DMARDs because of inefficacy: None  Discontinued DMARDs because of side effects: None    HISTORY OF PRESENT ILLNESS    Mr. Echevarria returns for a follow-up.  8/22/2022. Oral ulcers have improved with increase in folic acid to 2mg daily, has only had one significant flare of mouth ulcers which occurred within the last month. Remains on methotrexate 25mg subQ weekly and daily colchicine. .    No joint complaints currently. Can have 20min of low back pain and stiffness that resolve once he gets moving for the day. Started nasal mometasone for chronic sinus congestion. Oral antihistamines weren't helping and may have been contributing to epistaxis. No chronic cough or dyspnea on exertion. Has ENT followup upcoming. Has been getting headaches, more common around this time of year, \"migraine-level\" pain in the temples, can last for 5 days. Took a migraine sample pill (thinks likely a triptan) which resolved that headache.     Exercises for still upwards of an hour a day. Lifting weights and cycling. No hearing changes. REVIEW OF SYSTEMS    A comprehensive review of systems was performed and pertinent results are documented in the HPI, review of systems is otherwise non-contributory. PAST MEDICAL HISTORY    He has a past medical history of Autoimmune disease ([address] FAMILY HISTORY    His family history includes Dementia in his maternal grandmother; Heart Disease in his maternal grandfather and paternal grandfather; No Known Problems in his father, mother, and sister. SOCIAL HISTORY    He reports that he has never smoked. He has never used smokeless tobacco. He reports current alcohol use. He reports that he does not use drugs. MEDICATIONS    Current Outpatient Medications   Medication Sig    methotrexate 25 mg/mL chemo injection 1 mL by SubCUTAneous route every Wednesday. folic acid (FOLVITE) 1 mg tablet Take 2 Tablets by mouth daily. colchicine 0.6 mg tablet Take 1 Tablet by mouth daily. omega 3-dha-epa-fish oil 100-160-1,000 mg cap Take  by mouth daily. (Patient not taking: No sig reported)    multivitamin (ONE A DAY) tablet Take 1 Tab by mouth daily. calcium carbonate (OS-BARBARA) 500 mg calcium (1,250 mg) tablet Take  by mouth daily. No current facility-administered medications for this visit. ALLERGIES    No Known Allergies    PHYSICAL EXAMINATION    Visit Vitals  /68 (BP 1 Location: Left upper arm, BP Patient Position: Sitting, BP Cuff Size: Adult)   Pulse 66   Temp 98.5 °F (36.9 °C) (Oral)   Resp 18   Ht 5' 7\" (1.702 m)   Wt 175 lb 6.4 oz (79.6 kg)   SpO2 97%   BMI 27.47 kg/m²     General:  The patient is well developed, well nourished, alert, and in no apparent distress. Eyes: Sclera are anicteric. No conjunctival injection. HEENT:  Oropharynx is clear. No current oral or nasal ulcers. Adequate salivary pooling. No cervical or supraclavicular lymphadenopathy.    Lungs:  Clear to auscultation bilaterally, without wheeze or stridor. Normal respiratory effort. Cor:  Regular rate and rhythm. No murmur rub or gallop. Abdomen: Soft, non-tender, without hepatomegaly or masses. Extremities: No calf tenderness or edema. Warm and well perfused. Skin:  Stable scattered flesh colored non-pruritic papules on R elbow. Neuro: Nonfocal, negative seated straight leg raise bilaterally. Normal unassisted gait. Musculoskeletal:    A comprehensive musculoskeletal exam was performed for all joints of each upper and lower extremity and assessed for swelling, tenderness and range of motion. Results are documented as below:  Negative CAROL. No significant paralumbar tenderness today  No evidence of synovitis in the small joints of the hands, wrists, shoulders, elbows, hips, knees or ankles. DATA REVIEW    Laboratory     Recent laboratory results were reviewed, summarized, and discussed with the patient. 11/18/22: ESR 6, Cr 0.8, LFT WNL, CBC WNL, CRP <1 mg/L  7/15/22: QuantiFERON plus neg, ESR 12, Cr 0.92, LFT WNL, CBC WNL, CRP <1 mg/L  3/18/22: ESR 4, Cr 0.88, WBC 4.1, HGB 14.9, Plt 248, CRP <1 mg/L, Hep C Virus Ab <0.1, Hepatitis B evaluation normal  11/24/21: CBC WNL; Cr 0.80, LFTs WNL      Imaging    Musculoskeletal Ultrasound    None    Radiographs    XR SPINE LUMB 2 OR 3 V (7/22/22): 1. No fracture. 2. Dextroscoliosis and L5-S1 grade 2 anterolisthesis. If comparison imaging becomes available, I will addend this report. XR SI JTS MIN 3 V (7/22/22): Normal sacroiliac joints. XR ELBOW LT AP/LAT 5/12/21: Satisfactory reduction. 5/12/21 XR WRIST LT AP/LAT: No fracture or dislocation. XR ELBOW LT MIN 3 V 5/12/21: Elbow dislocation. Bilateral Hand 6/26/2019: LEFT: There is no acute fracture or bony erosion. The joint spacesare maintained. The soft tissues are unremarkable. RIGHT: There is no acute fracture or bony erosion. The joint spaces are maintained.  The soft tissues are unremarkable. Sacroiliac Joint 6/26/2019: There is no acute fracture or bony erosion. The sacroiliac and hip joint spaces are maintained. There is no sclerosis adjacent to the sacroiliac joints. Bilateral Foot 6/26/2019: LEFT: There is no acute fracture or bony erosion. The joint spaces are maintained. The soft tissues are unremarkable. RIGHT: There is no acute fracture or bony erosion. The joint spaces are maintained. The soft tissues are unremarkable. CT Imaging    None    MR Imaging    None    DXA     None    ASSESSMENT AND PLAN    This is a follow-up visit for Mr. Lucy Deras for likely psoriatic arthritis with peripheral arthralgias, oral aphthosis, and costochondritis still improved with full-dose methotrexate. Overall pleased with his current regimen, and he would prefer to delay further tapering after a flare with his last dose adjustment. No changes to immunosuppression today. 1. Psoriatic arthritis ([facility]  - ANCA PANEL; Future  - C REACTIVE PROTEIN, QT; Future  - CBC WITH AUTOMATED DIFF; Future  - METABOLIC PANEL, COMPREHENSIVE; Future  - SED RATE (ESR); Future  - methotrexate 25 mg/mL chemo injection; 1 mL by SubCUTAneous route every Wednesday. Dispense: 12 mL; Refill: 0    2. Long-term use of immunosuppressant medication  - ANCA PANEL; Future  - CBC WITH AUTOMATED DIFF; Future  - METABOLIC PANEL, COMPREHENSIVE; Future  - methotrexate 25 mg/mL chemo injection; 1 mL by SubCUTAneous route every Wednesday. Dispense: 12 mL; Refill: 0  - folic acid (FOLVITE) 1 mg tablet; Take 2 Tablets by mouth daily. Dispense: 180 Tablet; Refill: 3    3. Chronic sinusitis, unspecified location  - ANCA PANEL; Future    4. Oral ulcer  - colchicine 0.6 mg tablet; Take 1 Tablet by mouth daily. Dispense: 90 Tablet; Refill: 1    5. 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oarthritis (HCC)  - folic acid (FOLVITE) 1 mg tablet; Take 2 Tablets by mouth daily. Dispense: 180 Tablet;  Refill: 3    Patient Instructions   Continue methotrexate 25mg subQ weekly, and 2mg daily folic acid  Continue colchicine once daily, if you experience new numbness/tingling or muscle pain/weakness then stop and let me know  Repeat labs in 3 months  Return in 3-4 months.     TODAY'S ORDERS    Orders Placed This Encounter    ANCA PANEL    C REACTIVE PROTEIN, QT    CBC WITH AUTOMATED DIFF    METABOLIC PANEL, COMPREHENSIVE    SED RATE (ESR)    mometasone (NASONEX) 50 mcg/actuation nasal spray    Insulin Syringe-Needle U-100 1 mL 30 gauge x 5/16 syrg    colchicine 0.6 mg tablet    methotrexate 25 mg/mL chemo injection    folic acid (FOLVITE) 1 mg tablet         Future Appointments   Date Time Provider Jorge Tami   3/22/2023  9:20 AM Refugio Kwok MD [facility] BS AMB     Face to face time: 18 minutes  Note preparation and records review day of service: 15 minutes  Total provider time day of service: 33 minutes    Selam Simpson MD    Adult Rheumatology   [facility]  A Part of [facility], [address] [facility]   Phone [phone]  Fax [phone]

## 2022-12-22 ENCOUNTER — OFFICE VISIT (OUTPATIENT)
Dept: RHEUMATOLOGY | Age: 37
End: 2022-12-22
Payer: COMMERCIAL

## 2022-12-22 VITALS
BODY MASS INDEX: 27.53 KG/M2 | WEIGHT: 175.4 LBS | DIASTOLIC BLOOD PRESSURE: 68 MMHG | RESPIRATION RATE: 18 BRPM | TEMPERATURE: 98.5 F | HEIGHT: 67 IN | HEART RATE: 66 BPM | OXYGEN SATURATION: 97 % | SYSTOLIC BLOOD PRESSURE: 116 MMHG

## 2022-12-22 DIAGNOSIS — M45.A0 NON-RADIOGRAPHIC AXIAL SPONDYLOARTHRITIS (HCC): ICD-10-CM

## 2022-12-22 DIAGNOSIS — L40.50 PSORIATIC ARTHRITIS (HCC): Primary | ICD-10-CM

## 2022-12-22 DIAGNOSIS — K12.1 ORAL ULCER: ICD-10-CM

## 2022-12-22 DIAGNOSIS — J32.9 CHRONIC SINUSITIS, UNSPECIFIED LOCATION: ICD-10-CM

## 2022-12-22 DIAGNOSIS — Z79.60 LONG-TERM USE OF IMMUNOSUPPRESSANT MEDICATION: ICD-10-CM

## 2022-12-22 RX ORDER — MOMETASONE FUROATE 50 UG/1
SPRAY, METERED NASAL
COMMUNITY
Start: 2022-12-14

## 2022-12-22 RX ORDER — METHOTREXATE 25 MG/ML
25 INJECTION, SOLUTION INTRA-ARTERIAL; INTRAMUSCULAR; INTRAVENOUS
Qty: 12 ML | Refills: 0 | Status: SHIPPED | OUTPATIENT
Start: 2022-12-28

## 2022-12-22 RX ORDER — COLCHICINE 0.6 MG/1
0.6 TABLET ORAL DAILY
Qty: 90 TABLET | Refills: 1 | Status: SHIPPED | OUTPATIENT
Start: 2022-12-22

## 2022-12-22 RX ORDER — SYRINGE-NEEDLE,INSULIN,0.5 ML 30 GX5/16"
SYRINGE, EMPTY DISPOSABLE MISCELLANEOUS
COMMUNITY

## 2022-12-22 RX ORDER — FOLIC ACID 1 MG/1
2 TABLET ORAL DAILY
Qty: 180 TABLET | Refills: 3 | Status: SHIPPED | OUTPATIENT
Start: 2022-12-22

## 2022-12-22 NOTE — PROGRESS NOTES
Rm    Chief Complaint   Patient presents with    Follow-up     Axial Spondyloarthritis        Visit Vitals  /68 (BP 1 Location: Left upper arm, BP Patient Position: Sitting, BP Cuff Size: Adult)   Pulse 66   Temp 98.5 °F (36.9 °C) (Oral)   Resp 18   Ht 5' 7\" (1.702 m)   Wt 175 lb 6.4 oz (79.6 kg)   SpO2 97%   BMI 27.47 kg/m²        1. Have you been to the ER, urgent care clinic since your last visit? Hospitalized since your last visit? No    2. Have you seen or consulted any other health care providers outside of the [address] [place] [address] since your last visit? Include any pap smears or colon screening. No     Health Maintenance Due   Topic Date Due    COVID-19 Vaccine (1) Never done    DTaP/Tdap/Td series (1 - Tdap) Never done    Flu Vaccine (1) 08/01/2022        3 most recent PHQ Screens 12/22/2022   Little interest or pleasure in doing things Not at all   Feeling down, depressed, irritable, or hopeless Not at all   Total Score PHQ 2 0        No flowsheet data found.     Learning Assessment 2/14/2022   PRIMARY LEARNER Patient   CO-LEARNER CAREGIVER -   PRIMARY LANGUAGE ENGLISH   LEARNER PREFERENCE PRIMARY DEMONSTRATION   ANSWERED BY patient   RELATIONSHIP SELF

## 2022-12-22 NOTE — LETTER
12/22/2022    Patient: Lucretia Wagner   YOB: 1985   Date of Visit: 12/22/2022     Shara Rojo MD  [address] [address]  Via Fax: [phone]    Dear Shara Rojo MD,    We recently saw Mr. Lucretia Wagner in the [facility] for evaluation. My notes for this consultation are attached. If you have questions, please do not hesitate to call me. I look forward to following your patient along with you.       Sincerely,  Svitlana Quinn MD S  Cell: [phone]

## 2022-12-22 NOTE — PATIENT INSTRUCTIONS
Continue methotrexate 25mg subQ weekly, and 2mg daily folic acid  Continue colchicine once daily, if you experience new numbness/tingling or muscle pain/weakness then stop and let me know  Repeat labs in 3 months  Return in 3-4 months.

## 2023-03-22 ENCOUNTER — OFFICE VISIT (OUTPATIENT)
Dept: RHEUMATOLOGY | Age: 38
End: 2023-03-22
Payer: COMMERCIAL

## 2023-03-22 VITALS
BODY MASS INDEX: 28.04 KG/M2 | RESPIRATION RATE: 16 BRPM | TEMPERATURE: 98.5 F | WEIGHT: 179 LBS | SYSTOLIC BLOOD PRESSURE: 122 MMHG | HEART RATE: 74 BPM | DIASTOLIC BLOOD PRESSURE: 79 MMHG | OXYGEN SATURATION: 98 %

## 2023-03-22 DIAGNOSIS — L40.50 PSORIATIC ARTHRITIS (HCC): Primary | ICD-10-CM

## 2023-03-22 DIAGNOSIS — J32.9 CHRONIC SINUSITIS, UNSPECIFIED LOCATION: ICD-10-CM

## 2023-03-22 DIAGNOSIS — Z79.60 LONG-TERM USE OF IMMUNOSUPPRESSANT MEDICATION: ICD-10-CM

## 2023-03-22 PROCEDURE — 99214 OFFICE O/P EST MOD 30 MIN: CPT | Performed by: INTERNAL MEDICINE

## 2023-03-22 NOTE — PROGRESS NOTES
REASON FOR VISIT    This is an in-office follow-up visit for Mr. Tamara Decker for     ICD-10-CM   1. 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oarthritis ([facility] M46.90       Inflammatory arthritis phenotype includes:  Anti-CCP positive: no  Rheumatoid factor positive: no  HLA-B27 positive: yes  Inflammatory Back Pain: yes  Erosive disease: no  Sacroiliitis: no  Ankylosis: no  Psoriasis: no  Enthesitis: no  Dactylitis: no  Nail Pitting: no  Onycholysis: no  Splinter Hemorrhage: no  Extra-articular manifestations include: sternal pain (costochondritis), oral painful/painless ulcers (since childhood), chronic sinus congestion  SAPHO: no    Immunosuppression Screening (6/26/2019): Quantiferon TB: negative  PPD:  Not performed  Hepatitis B: negative  Hepatitis C: negative    Therapy History includes:  Current DMARD therapy include: methotrexate 25 mg SubQ every Wednesday (6/10/2020 to present)  Prior DMARD therapy include: methotrexate 20 mg every Saturday (7/01/2019 to 6/10/2020)  Discontinued DMARDs because of inefficacy: None  Discontinued DMARDs because of side effects: None    HISTORY OF PRESENT ILLNESS    Mr. Tamara Decker returns for a follow-up. LV 12/22/2022. Remains on 25mg methotrexate subQ, folic acid 2mg daily. No new rashes, used some ceravie on the elbows a couple of months ago with good improvement. Saw an ENT who added an antihistamine to his nasal steroid, with good improvement in his chronic sinus congestion. Hasn't needed antibiotics for the sinuses. Has previously had problems with chronic prostatitis. Had tried multiple treatments previously including extended antibiotics, had followed with [facility] but last seen over a year ago and he thinks his provider had retired. Deandre Somers at last contact, they had resigned themselves to periodic flares of nonsevere prostatitis. Had about 12 months without symptoms, but having now penile and pudendal pains over the last week, still relatively mild and spontaneously improvement.  No fevers or chills, no gross hematuria. REVIEW OF SYSTEMS    A comprehensive review of systems was performed and pertinent results are documented in the HPI, review of systems is otherwise non-contributory. PAST MEDICAL HISTORY    He has a past medical history of Autoimmune disease ([address] FAMILY HISTORY    His family history includes Dementia in his maternal grandmother; Heart Disease in his maternal grandfather and paternal grandfather; No Known Problems in his father, mother, and sister. SOCIAL HISTORY    He reports that he has never smoked. He has never used smokeless tobacco. He reports current alcohol use. He reports that he does not use drugs. MEDICATIONS    Current Outpatient Medications   Medication Sig    mometasone (NASONEX) 50 mcg/actuation nasal spray SPRAY 2 SPRAYS INTO EACH NOSTRIL EVERY DAY FOR 30 DAYS    Insulin Syringe-Needle U-100 1 mL 30 gauge x 5/16 syrg insulin syringe U-100 with needle 1 mL 30 gauge x 5/16\"   USE EVERY SATURDAY FOR METHOTREXATE SOLUTION    colchicine 0.6 mg tablet Take 1 Tablet by mouth daily. methotrexate 25 mg/mL chemo injection 1 mL by SubCUTAneous route every Wednesday. folic acid (FOLVITE) 1 mg tablet Take 2 Tablets by mouth daily. multivitamin (ONE A DAY) tablet Take 1 Tab by mouth daily. calcium carbonate (OS-BARBARA) 500 mg calcium (1,250 mg) tablet Take  by mouth daily. omega 3-dha-epa-fish oil 100-160-1,000 mg cap Take  by mouth daily. (Patient not taking: Reported on 3/22/2023)     No current facility-administered medications for this visit. ALLERGIES    No Known Allergies    PHYSICAL EXAMINATION    Visit Vitals  /79 (BP 1 Location: Left upper arm, BP Patient Position: Sitting, BP Cuff Size: Adult)   Pulse 74   Temp 98.5 °F (36.9 °C) (Temporal)   Resp 16   Wt 179 lb (81.2 kg)   SpO2 98%   BMI 28.04 kg/m²     General:  The patient is well developed, well nourished, alert, and in no apparent distress. Eyes: Sclera are anicteric. No conjunctival injection. HEENT:  Oropharynx is clear. No current oral or nasal ulcers. Adequate salivary pooling. No cervical or supraclavicular lymphadenopathy. Lungs:  Clear to auscultation bilaterally, without wheeze or stridor. Normal respiratory effort. Cor:  Regular rate and rhythm. No murmur rub or gallop. Abdomen: Soft, non-tender, without hepatomegaly or masses. Extremities: No calf tenderness or edema. Warm and well perfused. Skin:  Interval cleared right elbow. No petechial, purpuric, or psoriaform rashes   Neuro: Nonfocal, negative seated straight leg raise bilaterally. Normal unassisted gait. Musculoskeletal:    A comprehensive musculoskeletal exam was performed for all joints of each upper and lower extremity and assessed for swelling, tenderness and range of motion. Results are documented as below:  Negative CAROL. No significant paralumbar tenderness today  No evidence of synovitis in the small joints of the hands, wrists, shoulders, elbows, hips, knees or ankles. DATA REVIEW    Laboratory     Recent laboratory results were reviewed, summarized, and discussed with the patient. 11/18/22: ESR 6, Cr 0.8, LFT WNL, CBC WNL, CRP <1 mg/L  7/15/22: QuantiFERON plus neg, ESR 12, Cr 0.92, LFT WNL, CBC WNL, CRP <1 mg/L  3/18/22: ESR 4, Cr 0.88, WBC 4.1, HGB 14.9, Plt 248, CRP <1 mg/L, Hep C Virus Ab <0.1, Hepatitis B evaluation normal  11/24/21: CBC WNL; Cr 0.80, LFTs WNL      Imaging    Musculoskeletal Ultrasound    None    Radiographs    XR SPINE LUMB 2 OR 3 V (7/22/22): 1. No fracture. 2. Dextroscoliosis and L5-S1 grade 2 anterolisthesis. If comparison imaging becomes available, I will addend this report. XR SI JTS MIN 3 V (7/22/22): Normal sacroiliac joints. XR ELBOW LT AP/LAT 5/12/21: Satisfactory reduction. 5/12/21 XR WRIST LT AP/LAT: No fracture or dislocation. XR ELBOW LT MIN 3 V 5/12/21: Elbow dislocation.     Bilateral Hand 6/26/2019: LEFT: There is no acute fracture or bony erosion. The joint spacesare maintained. The soft tissues are unremarkable. RIGHT: There is no acute fracture or bony erosion. The joint spaces are maintained. The soft tissues are unremarkable. Sacroiliac Joint 6/26/2019: There is no acute fracture or bony erosion. The sacroiliac and hip joint spaces are maintained. There is no sclerosis adjacent to the sacroiliac joints. Bilateral Foot 6/26/2019: LEFT: There is no acute fracture or bony erosion. The joint spaces are maintained. The soft tissues are unremarkable. RIGHT: There is no acute fracture or bony erosion. The joint spaces are maintained. The soft tissues are unremarkable. CT Imaging    None    MR Imaging    None    DXA     None    ASSESSMENT AND PLAN    This is a follow-up visit for Mr. Emil Reed for psoriatic arthritis with peripheral arthralgias, oral aphthosis, and costochondritis still improved with full-dose methotrexate. Encouraged him to reestablish with Urology for possible return of chronic prostatitis, continuing current methotrexate. Discussed possibility of a transition to [place] if recurrent infections limit methotrexate use. 1. Psoriatic arthritis (HCC)  - Cont methotrexate 25mg subQ weekly  - C REACTIVE PROTEIN, QT; Future  - CBC WITH AUTOMATED DIFF; Future  - METABOLIC PANEL, COMPREHENSIVE; Future  - SED RATE (ESR); Future  - ANCA PANEL; Future    2. Long-term use of immunosuppressant medication  - CBC WITH AUTOMATED DIFF; Future  - METABOLIC PANEL, COMPREHENSIVE; Future    3. Chronic sinusitis, unspecified location  - ANCA PANEL; Future      Patient Instructions   Continue methotrexate 25mg subQ weekly, and 2mg daily folic acid. Repeat labs ASAP  Call with increased rashes or joint pain any time before I leave in June.   I am unfortunately leaving the practice and the [facility] area after June; our practice will be sending out letters shortly with recommendations on area practices you can reach out to, as I'm afraid the chances they'll be able to get a provider in to take my place quickly are pretty low. TODAY'S ORDERS    Orders Placed This Encounter    C REACTIVE PROTEIN, QT    CBC WITH AUTOMATED DIFF    METABOLIC PANEL, COMPREHENSIVE    SED RATE (ESR)    ANCA PANEL     No future appointments.     Face to face time: 15 minutes  Note preparation and records review day of service: 19 minutes  Total provider time day of service: 34 minutes    Lenny Gomez MD    Adult Rheumatology   [address] [place] [address], [address] [place] [address]   Phone [phone]  Fax [phone]

## 2023-03-22 NOTE — PROGRESS NOTES
Chief Complaint   Patient presents with    Joint Pain     1. Have you been to the ER, urgent care clinic since your last visit? Hospitalized since your last visit? No    2. Have you seen or consulted any other health care providers outside of the [address] [place] [address] since your last visit? Include any pap smears or colon screening.  Yes on file

## 2023-03-22 NOTE — LETTER
3/23/2023    Patient: Orlando Diop   YOB: 1985   Date of Visit: 3/22/2023     Cydney Bush MD  [address] [address]  Via Fax: [phone]    Dear Cydney Bush MD,    We recently saw Mr. Orlando Diop in the [facility] for evaluation. My notes for this consultation are attached. If you have questions, please do not hesitate to call me.        Sincerely,    Marquis Jamey MD [facility]  Cell: [phone]

## 2023-03-22 NOTE — PATIENT INSTRUCTIONS
Continue methotrexate 25mg subQ weekly, and 2mg daily folic acid. Repeat labs ASAP  Call with increased rashes or joint pain any time before I leave in June. I am unfortunately leaving the practice and the [address] area after June; our practice will be sending out letters shortly with recommendations on area practices you can reach out to, as I'm afraid the chances they'll be able to get a provider in to take my place quickly are pretty low.

## 2023-04-23 DIAGNOSIS — M54.89 VERTEBROGENIC PAIN SYNDROME: Primary | ICD-10-CM

## 2023-04-25 LAB
ALBUMIN SERPL-MCNC: 4.8 G/DL (ref 4–5)
ALBUMIN/GLOB SERPL: 2.1 {RATIO} (ref 1.2–2.2)
ALP SERPL-CCNC: 84 IU/L (ref 44–121)
ALT SERPL-CCNC: 29 IU/L (ref 0–44)
AST SERPL-CCNC: 32 IU/L (ref 0–40)
BASOPHILS # BLD AUTO: 0 X10E3/UL (ref 0–0.2)
BASOPHILS NFR BLD AUTO: 0 %
BILIRUB SERPL-MCNC: 0.7 MG/DL (ref 0–1.2)
BUN SERPL-MCNC: 13 MG/DL (ref 6–20)
BUN/CREAT SERPL: 13 (ref 9–20)
C-ANCA TITR SER IF: NORMAL TITER
CALCIUM SERPL-MCNC: 9.9 MG/DL (ref 8.7–10.2)
CHLORIDE SERPL-SCNC: 105 MMOL/L (ref 96–106)
CO2 SERPL-SCNC: 22 MMOL/L (ref 20–29)
CREAT SERPL-MCNC: 0.98 MG/DL (ref 0.76–1.27)
CRP SERPL-MCNC: <1 MG/L (ref 0–10)
EGFRCR SERPLBLD CKD-EPI 2021: 102 ML/MIN/1.73
EOSINOPHIL # BLD AUTO: 0 X10E3/UL (ref 0–0.4)
EOSINOPHIL NFR BLD AUTO: 1 %
ERYTHROCYTE [DISTWIDTH] IN BLOOD BY AUTOMATED COUNT: 13.7 % (ref 11.6–15.4)
ERYTHROCYTE [SEDIMENTATION RATE] IN BLOOD BY WESTERGREN METHOD: 14 MM/HR (ref 0–15)
GLOBULIN SER CALC-MCNC: 2.3 G/DL (ref 1.5–4.5)
GLUCOSE SERPL-MCNC: 105 MG/DL (ref 70–99)
HCT VFR BLD AUTO: 42.3 % (ref 37.5–51)
HGB BLD-MCNC: 14.5 G/DL (ref 13–17.7)
IMM GRANULOCYTES # BLD AUTO: 0 X10E3/UL (ref 0–0.1)
IMM GRANULOCYTES NFR BLD AUTO: 0 %
LYMPHOCYTES # BLD AUTO: 1.6 X10E3/UL (ref 0.7–3.1)
LYMPHOCYTES NFR BLD AUTO: 24 %
MCH RBC QN AUTO: 30.6 PG (ref 26.6–33)
MCHC RBC AUTO-ENTMCNC: 34.3 G/DL (ref 31.5–35.7)
MCV RBC AUTO: 89 FL (ref 79–97)
MONOCYTES # BLD AUTO: 0.6 X10E3/UL (ref 0.1–0.9)
MONOCYTES NFR BLD AUTO: 10 %
MYELOPEROXIDASE AB SER IA-ACNC: <0.2 UNITS (ref 0–0.9)
NEUTROPHILS # BLD AUTO: 4.3 X10E3/UL (ref 1.4–7)
NEUTROPHILS NFR BLD AUTO: 65 %
P-ANCA ATYPICAL TITR SER IF: NORMAL TITER
P-ANCA TITR SER IF: NORMAL TITER
PLATELET # BLD AUTO: 307 X10E3/UL (ref 150–450)
POTASSIUM SERPL-SCNC: 4.5 MMOL/L (ref 3.5–5.2)
PROT SERPL-MCNC: 7.1 G/DL (ref 6–8.5)
PROTEINASE3 AB SER IA-ACNC: <0.2 UNITS (ref 0–0.9)
RBC # BLD AUTO: 4.74 X10E6/UL (ref 4.14–5.8)
SODIUM SERPL-SCNC: 145 MMOL/L (ref 134–144)
WBC # BLD AUTO: 6.6 X10E3/UL (ref 3.4–10.8)

## 2023-05-21 RX ORDER — MOMETASONE FUROATE 50 UG/1
SPRAY, METERED NASAL
COMMUNITY
Start: 2022-12-14

## 2023-05-21 RX ORDER — FOLIC ACID 1 MG/1
2 TABLET ORAL DAILY
COMMUNITY
Start: 2022-12-22

## 2023-05-21 RX ORDER — IBUPROFEN 200 MG
CAPSULE ORAL DAILY
COMMUNITY

## 2023-05-21 RX ORDER — METHOTREXATE SODIUM 25 MG/ML
25 INJECTION, SOLUTION INTRA-ARTERIAL; INTRAMUSCULAR; INTRAVENOUS
COMMUNITY
Start: 2022-12-28 | End: 2023-06-01

## 2023-05-21 RX ORDER — COLCHICINE 0.6 MG/1
0.6 TABLET ORAL DAILY
COMMUNITY
Start: 2022-12-22

## 2023-05-26 DIAGNOSIS — R09.81 NASAL CONGESTION: ICD-10-CM

## 2023-05-30 RX ORDER — MOMETASONE FUROATE 50 UG/1
SPRAY, METERED NASAL
Refills: 1 | OUTPATIENT
Start: 2023-05-30

## 2023-06-01 DIAGNOSIS — L40.50 ARTHROPATHIC PSORIASIS, UNSPECIFIED (HCC): ICD-10-CM

## 2023-06-01 DIAGNOSIS — Z79.60 LONG TERM (CURRENT) USE OF UNSPECIFIED IMMUNOMODULATORS AND IMMUNOSUPPRESSANTS: ICD-10-CM

## 2023-06-01 RX ORDER — METHOTREXATE 25 MG/ML
INJECTION, SOLUTION INTRA-ARTERIAL; INTRAMUSCULAR; INTRAVENOUS
Qty: 12 ML | Refills: 0 | Status: SHIPPED | OUTPATIENT
Start: 2023-06-01

## 2023-06-01 RX ORDER — METHOTREXATE 25 MG/ML
INJECTION, SOLUTION INTRA-ARTERIAL; INTRAMUSCULAR; INTRAVENOUS
Qty: 12 ML | Refills: 3 | Status: SHIPPED | OUTPATIENT
Start: 2023-06-01 | End: 2023-06-01 | Stop reason: SDUPTHER

## 2023-06-01 NOTE — TELEPHONE ENCOUNTER
Last visit 03/22/23  Lab Results   Component Value Date     (H) 04/21/2023    K 4.5 04/21/2023     04/21/2023    CO2 22 04/21/2023    BUN 13 04/21/2023    CREATININE 0.98 04/21/2023    GLUCOSE 105 (H) 04/21/2023    CALCIUM 9.9 04/21/2023    PROT 7.1 04/21/2023    LABALBU 4.8 04/21/2023    BILITOT 0.7 04/21/2023    ALKPHOS 84 04/21/2023    AST 32 04/21/2023    ALT 29 04/21/2023    LABGLOM 102 04/21/2023    GFRAA 134 11/24/2021    AGRATIO 2.1 04/21/2023     Lab Results   Component Value Date    WBC 6.6 04/21/2023    HGB 14.5 04/21/2023    HCT 42.3 04/21/2023    MCV 89 04/21/2023     04/21/2023

## 2024-06-25 ENCOUNTER — APPOINTMENT (OUTPATIENT)
Facility: HOSPITAL | Age: 39
End: 2024-06-25
Payer: COMMERCIAL

## 2024-06-25 ENCOUNTER — HOSPITAL ENCOUNTER (EMERGENCY)
Facility: HOSPITAL | Age: 39
Discharge: HOME | End: 2024-06-25
Attending: EMERGENCY MEDICINE
Payer: COMMERCIAL

## 2024-06-25 VITALS
TEMPERATURE: 97.8 F | SYSTOLIC BLOOD PRESSURE: 120 MMHG | HEART RATE: 78 BPM | WEIGHT: 176 LBS | OXYGEN SATURATION: 97 % | RESPIRATION RATE: 11 BRPM | BODY MASS INDEX: 27.57 KG/M2 | DIASTOLIC BLOOD PRESSURE: 87 MMHG

## 2024-06-25 DIAGNOSIS — T07.XXXA ABRASIONS OF MULTIPLE SITES: ICD-10-CM
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
## 2024-06-25 DIAGNOSIS — T14.8XXA AVULSION FRACTURE: ICD-10-CM

## 2024-06-25 DIAGNOSIS — S53.104A CLOSED DISLOCATION OF RIGHT ELBOW, INITIAL ENCOUNTER: Primary | ICD-10-CM

## 2024-06-25 LAB
ANION GAP SERPL CALC-SCNC: 4 MMOL/L (ref 5–15)
BASOPHILS # BLD: 0 K/UL (ref 0–0.1)
BASOPHILS NFR BLD: 0 % (ref 0–1)
BUN SERPL-MCNC: 20 MG/DL (ref 6–20)
BUN/CREAT SERPL: 16 (ref 12–20)
CALCIUM SERPL-MCNC: 9.6 MG/DL (ref 8.5–10.1)
CHLORIDE SERPL-SCNC: 107 MMOL/L (ref 97–108)
CO2 SERPL-SCNC: 29 MMOL/L (ref 21–32)
COMMENT:: NORMAL
CREAT SERPL-MCNC: 1.23 MG/DL (ref 0.7–1.3)
DIFFERENTIAL METHOD BLD: NORMAL
EOSINOPHIL # BLD: 0.2 K/UL (ref 0–0.4)
EOSINOPHIL NFR BLD: 3 % (ref 0–7)
ERYTHROCYTE [DISTWIDTH] IN BLOOD BY AUTOMATED COUNT: 13.4 % (ref 11.5–14.5)
GLUCOSE SERPL-MCNC: 114 MG/DL (ref 65–100)
HCT VFR BLD AUTO: 41.3 % (ref 36.6–50.3)
HGB BLD-MCNC: 13.9 G/DL (ref 12.1–17)
IMM GRANULOCYTES # BLD AUTO: 0 K/UL (ref 0–0.04)
IMM GRANULOCYTES NFR BLD AUTO: 0 % (ref 0–0.5)
LYMPHOCYTES # BLD: 1.1 K/UL (ref 0.8–3.5)
LYMPHOCYTES NFR BLD: 18 % (ref 12–49)
MCH RBC QN AUTO: 29.7 PG (ref 26–34)
MCHC RBC AUTO-ENTMCNC: 33.7 G/DL (ref 30–36.5)
MCV RBC AUTO: 88.2 FL (ref 80–99)
MONOCYTES # BLD: 0.6 K/UL (ref 0–1)
MONOCYTES NFR BLD: 9 % (ref 5–13)
NEUTS SEG # BLD: 4.5 K/UL (ref 1.8–8)
NEUTS SEG NFR BLD: 70 % (ref 32–75)
NRBC # BLD: 0 K/UL (ref 0–0.01)
NRBC BLD-RTO: 0 PER 100 WBC
PLATELET # BLD AUTO: 220 K/UL (ref 150–400)
PMV BLD AUTO: 9.3 FL (ref 8.9–12.9)
POTASSIUM SERPL-SCNC: 3.7 MMOL/L (ref 3.5–5.1)
RBC # BLD AUTO: 4.68 M/UL (ref 4.1–5.7)
SODIUM SERPL-SCNC: 140 MMOL/L (ref 136–145)
SPECIMEN HOLD: NORMAL
WBC # BLD AUTO: 6.5 K/UL (ref 4.1–11.1)

## 2024-06-25 PROCEDURE — 73030 X-RAY EXAM OF SHOULDER: CPT

## 2024-06-25 PROCEDURE — 99285 EMERGENCY DEPT VISIT HI MDM: CPT

## 2024-06-25 PROCEDURE — 99284 EMERGENCY DEPT VISIT MOD MDM: CPT | Performed by: PHYSICIAN ASSISTANT

## 2024-06-25 PROCEDURE — 99152 MOD SED SAME PHYS/QHP 5/>YRS: CPT

## 2024-06-25 PROCEDURE — 24600 TX CLSD ELBOW DISLC W/O ANES: CPT

## 2024-06-25 PROCEDURE — 73110 X-RAY EXAM OF WRIST: CPT

## 2024-06-25 PROCEDURE — 73070 X-RAY EXAM OF ELBOW: CPT

## 2024-06-25 PROCEDURE — 96376 TX/PRO/DX INJ SAME DRUG ADON: CPT

## 2024-06-25 PROCEDURE — 90714 TD VACC NO PRESV 7 YRS+ IM: CPT | Performed by: EMERGENCY MEDICINE

## 2024-06-25 PROCEDURE — 90471 IMMUNIZATION ADMIN: CPT | Performed by: EMERGENCY MEDICINE

## 2024-06-25 PROCEDURE — 80048 BASIC METABOLIC PNL TOTAL CA: CPT

## 2024-06-25 PROCEDURE — 85025 COMPLETE CBC W/AUTO DIFF WBC: CPT

## 2024-06-25 PROCEDURE — 6370000000 HC RX 637 (ALT 250 FOR IP): Performed by: EMERGENCY MEDICINE

## 2024-06-25 PROCEDURE — 96374 THER/PROPH/DIAG INJ IV PUSH: CPT

## 2024-06-25 PROCEDURE — 36415 COLL VENOUS BLD VENIPUNCTURE: CPT

## 2024-06-25 PROCEDURE — 24600 TX CLSD ELBOW DISLC W/O ANES: CPT | Performed by: PHYSICIAN ASSISTANT

## 2024-06-25 PROCEDURE — 6360000002 HC RX W HCPCS: Performed by: EMERGENCY MEDICINE

## 2024-06-25 RX ORDER — HYDROMORPHONE HYDROCHLORIDE 1 MG/ML
0.5 INJECTION, SOLUTION INTRAMUSCULAR; INTRAVENOUS; SUBCUTANEOUS
Status: COMPLETED | OUTPATIENT
Start: 2024-06-25 | End: 2024-06-25

## 2024-06-25 RX ORDER — HYDROCODONE BITARTRATE AND ACETAMINOPHEN 5; 325 MG/1; MG/1
1-2 TABLET ORAL EVERY 6 HOURS PRN
Qty: 12 TABLET | Refills: 0 | Status: SHIPPED | OUTPATIENT
Start: 2024-06-25 | End: 2024-06-28

## 2024-06-25 RX ORDER — HYDROCODONE BITARTRATE AND ACETAMINOPHEN 5; 325 MG/1; MG/1
2 TABLET ORAL
Status: COMPLETED | OUTPATIENT
Start: 2024-06-25 | End: 2024-06-25

## 2024-06-25 RX ORDER — HYDROMORPHONE HYDROCHLORIDE 1 MG/ML
1 INJECTION, SOLUTION INTRAMUSCULAR; INTRAVENOUS; SUBCUTANEOUS
Status: COMPLETED | OUTPATIENT
Start: 2024-06-25 | End: 2024-06-25

## 2024-06-25 RX ORDER — DICLOFENAC SODIUM 75 MG/1
75 TABLET, DELAYED RELEASE ORAL 2 TIMES DAILY PRN
Qty: 20 TABLET | Refills: 0 | Status: SHIPPED | OUTPATIENT
Start: 2024-06-25 | End: 2024-07-05

## 2024-06-25 RX ORDER — BACITRACIN ZINC AND POLYMYXIN B SULFATE 500; 1000 [USP'U]/G; [USP'U]/G
1 OINTMENT TOPICAL 2 TIMES DAILY PRN
Qty: 15 G | Refills: 1 | Status: SHIPPED | OUTPATIENT
Start: 2024-06-25 | End: 2024-06-30

## 2024-06-25 RX ADMIN — CLOSTRIDIUM TETANI TOXOID ANTIGEN (FORMALDEHYDE INACTIVATED) AND CORYNEBACTERIUM DIPHTHERIAE TOXOID ANTIGEN (FORMALDEHYDE INACTIVATED) 0.5 ML: 5; 2 INJECTION, SUSPENSION INTRAMUSCULAR at 12:45

## 2024-06-25 RX ADMIN — PROPOFOL 80 MG: 10 INJECTION, EMULSION INTRAVENOUS at 12:13

## 2024-06-25 RX ADMIN — HYDROMORPHONE HYDROCHLORIDE 1 MG: 1 INJECTION, SOLUTION INTRAMUSCULAR; INTRAVENOUS; SUBCUTANEOUS at 10:50

## 2024-06-25 RX ADMIN — HYDROCODONE BITARTRATE AND ACETAMINOPHEN 2 TABLET: 5; 325 TABLET ORAL at 13:42

## 2024-06-25 RX ADMIN — HYDROMORPHONE HYDROCHLORIDE 0.5 MG: 1 INJECTION, SOLUTION INTRAMUSCULAR; INTRAVENOUS; SUBCUTANEOUS at 11:38

## 2024-06-25 ASSESSMENT — PAIN DESCRIPTION - LOCATION
LOCATION: ARM

## 2024-06-25 ASSESSMENT — PAIN DESCRIPTION - PAIN TYPE
TYPE: ACUTE PAIN

## 2024-06-25 ASSESSMENT — PAIN SCALES - GENERAL
PAINLEVEL_OUTOF10: 2
PAINLEVEL_OUTOF10: 5
PAINLEVEL_OUTOF10: 5

## 2024-06-25 ASSESSMENT — PAIN DESCRIPTION - ORIENTATION
ORIENTATION: RIGHT

## 2024-06-25 ASSESSMENT — PAIN - FUNCTIONAL ASSESSMENT
PAIN_FUNCTIONAL_ASSESSMENT: 0-10
PAIN_FUNCTIONAL_ASSESSMENT: PREVENTS OR INTERFERES SOME ACTIVE ACTIVITIES AND ADLS

## 2024-06-25 ASSESSMENT — PAIN DESCRIPTION - ONSET
ONSET: SUDDEN
ONSET: SUDDEN
ONSET: ON-GOING

## 2024-06-25 ASSESSMENT — PAIN DESCRIPTION - FREQUENCY
FREQUENCY: CONTINUOUS

## 2024-06-25 ASSESSMENT — PAIN DESCRIPTION - DESCRIPTORS
DESCRIPTORS: SHOOTING;PRESSURE
DESCRIPTORS: SHOOTING
DESCRIPTORS: SHOOTING;PRESSURE
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
## 2024-06-25 NOTE — ED NOTES
Sling applied and discharge instructions given to patient, patient verbalized understanding. Ambulatory out of ED with steady gait

## 2024-06-25 NOTE — DISCHARGE INSTRUCTIONS
You may take acetaminophen (Tylenol) 2 tablets every 6 hours as well as an anti-inflammatory, either prescribed (i.e., Voltaren, Mobic) or over-the-counter (2 naproxen aka Aleve every 12 hours OR 2-3 ibuprofen aka Motrin every 6 hours) as needed for pain. Taking both the Tylenol as well as anti-inflammatory medications in combination can be especially effective.     You can take your prescribed narcotic pain medicine as well if your pain is not controlled. However, keep in mind that each tablet of your medication contains 1 tablet's worth of Tylenol (325mg) and you should not take more than 4000mg of Tylenol in 24 hours.    Make sure to not drive or operate heavy machinery while taking narcotic pain medications.    Ice your joints as needed for pain, 20 minutes at a time, every 1-2 hours for the next 2 days, then every 6-8 hours.    Return to the Emergency Department if you experience worsening pain, numbness, tingling, change of color in your toes or fingers, or any other concerning symptoms.

## 2024-06-25 NOTE — PROCEDURES
PROCEDURE NOTE  Date: 6/25/2024   Name: Darrin Perez  YOB: 1985    PROCEDURE NOTE - FRACTURE REDUCTION: The patient was found to have an acute dislocation of right elbow with associated coronoid fracture.  Dislocation indicated for close reduction under procedural sedation.  Risks and benefits of procedure were discussed in detail with patient agrees to proceed.  Consent signed and on chart.  Patient was induced with propofol for procedrual sedation via the emergency department MD. After it was confirmed that appropriate sedation had been reached, patient's right upper extremity was drawn into position of elbow flexion with full supination of the hand.  A laterally directed force applied over the medial epicondyles while medializing traction was applied along the olecranon.  Elbow was then drawn up into complete flexion and joint was palpably felt to reduce.  Joint then ranged from 0 to approximately 150 degrees and felt to be stable.  Right upper extremity was then placed in posterior long-arm splint.  Post procedure imaging was obtained showing adequate reduction of his elbow joint.  Patient able to move all fingers to command with intact distal sensory function.  Cap refill is brisk.  Patient tolerated procedure well.    Shaheen Loja PA-C  Orthopedic Trauma Service  [facility]

## 2024-06-25 NOTE — DISCHARGE INSTR - COC
Continuity of Care Form    Patient Name: Darrin Perez   :  1985  MRN:  [id number]    Admit date:  2024  Discharge date:  ***    Code Status Order: No Order   Advance Directives:     Admitting Physician:  No admitting provider for patient encounter.  PCP: Dagoberto Dumont MD    Discharging Nurse: ***  Discharging Hospital Unit/Room#: ER25  Discharging Unit Phone Number: ***    Emergency Contact:   Extended Emergency Contact Information  Primary Emergency Contact: Steve Perez  Home Phone: [phone]  Relation: Parent    Past Surgical History:  Past Surgical History:   Procedure Laterality Date    HERNIA REPAIR         Immunization History:   Immunization History   Administered Date(s) Administered    Influenza Virus Vaccine 2021    TD 5LF, TENIVAC, (age 7y+), IM, 0.5mL 2024       Active Problems:  Patient Active Problem List   Diagnosis Code    Closed anterior dislocation of left elbow S53.115A    Long-term use of immunosuppressant medication Z79.60    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oarthritis (HCC) M45.A0    Closed dislocation of right elbow S53.104A       Isolation/Infection:   Isolation            No Isolation          Patient Infection Status       None to display            Nurse Assessment:  Last Vital Signs: /87   Pulse 78   Temp 97.8 °F (36.6 °C) (Oral)   Resp 11   Wt 79.8 kg (176 lb)   SpO2 97%   BMI 27.57 kg/m²     Last documented pain score (0-10 scale): Pain Level: 2  Last Weight:   Wt Readings from Last 1 Encounters:   24 79.8 kg (176 lb)     Mental Status:  {IP PT MENTAL STATUS:64954}    IV Access:  { FIDELINA IV ACCESS:459822833}    Nursing Mobility/ADLs:  Walking   {CHP DME ADLs:409156311}  Transfer  {CHP DME ADLs:272724209}  Bathing  {CHP DME ADLs:187655455}  Dressing  {CHP DME ADLs:803232314}  Toileting  {CHP DME ADLs:029534113}  Feeding  {CHP DME ADLs:655704765}  Med Admin  {P DME ADLs:218708041}  Med Delivery   { FIDELINA MED  From: Leny Schmid  To: Milo Myrick  Sent: 9/22/2021 9:56 AM CDT  Subject: Surgery 1 October 2021    Good morning,     As you know, I’m having [facility] Surgery 1 October and the surgical center is requiring specific tests within 45 days of surgery. I need a BPM, EKG and A1C test. Dr. Almanzar is requesting the results be Faxed to [phone]. His contact is Zaira [phone]. The last blood tests I had was July 2021.     I would also like my kidney function test repeated since the last test showed a decline.     My appointment is 28 September with you and an intern. I’d like to have the tests done before I arrive so the surgery can be signed off on as it will only be 3 days prior to the procedure.     I am respectfully requesting the above tests be placed in que so I can go to [facility] for the blood tests and [facility] for the EKG test. I can also stop by the office to  the referrals regarding if need be.     Thank you in advance. [phone]
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
